# Patient Record
Sex: FEMALE | Race: WHITE | NOT HISPANIC OR LATINO | Employment: FULL TIME | RURAL
[De-identification: names, ages, dates, MRNs, and addresses within clinical notes are randomized per-mention and may not be internally consistent; named-entity substitution may affect disease eponyms.]

---

## 2017-07-19 ENCOUNTER — OFFICE VISIT (OUTPATIENT)
Dept: RETAIL CLINIC | Facility: CLINIC | Age: 37
End: 2017-07-19

## 2017-07-19 VITALS — RESPIRATION RATE: 18 BRPM | OXYGEN SATURATION: 97 % | TEMPERATURE: 97.7 F | HEART RATE: 88 BPM | WEIGHT: 140.8 LBS

## 2017-07-19 DIAGNOSIS — N39.0 URINARY TRACT INFECTION WITHOUT HEMATURIA, SITE UNSPECIFIED: Primary | ICD-10-CM

## 2017-07-19 LAB
BILIRUB BLD-MCNC: NEGATIVE MG/DL
CLARITY, POC: CLEAR
COLOR UR: YELLOW
GLUCOSE UR STRIP-MCNC: NEGATIVE MG/DL
KETONES UR QL: NEGATIVE
LEUKOCYTE EST, POC: ABNORMAL
NITRITE UR-MCNC: NEGATIVE MG/ML
PH UR: 6 [PH] (ref 5–8)
PROT UR STRIP-MCNC: ABNORMAL MG/DL
RBC # UR STRIP: NEGATIVE /UL
SP GR UR: 1.02 (ref 1–1.03)
UROBILINOGEN UR QL: NORMAL

## 2017-07-19 PROCEDURE — 99203 OFFICE O/P NEW LOW 30 MIN: CPT | Performed by: NURSE PRACTITIONER

## 2017-07-19 PROCEDURE — 81003 URINALYSIS AUTO W/O SCOPE: CPT | Performed by: NURSE PRACTITIONER

## 2017-07-19 RX ORDER — PHENAZOPYRIDINE HYDROCHLORIDE 200 MG/1
200 TABLET, FILM COATED ORAL 3 TIMES DAILY PRN
Qty: 12 TABLET | Refills: 0 | Status: SHIPPED | OUTPATIENT
Start: 2017-07-19 | End: 2017-07-21

## 2017-07-19 RX ORDER — NITROFURANTOIN 25; 75 MG/1; MG/1
100 CAPSULE ORAL 2 TIMES DAILY
Qty: 14 CAPSULE | Refills: 0 | Status: SHIPPED | OUTPATIENT
Start: 2017-07-19 | End: 2017-07-26

## 2017-07-19 NOTE — PATIENT INSTRUCTIONS
Urinary Tract Infection, Adult  A urinary tract infection (UTI) is an infection of any part of the urinary tract, which includes the kidneys, ureters, bladder, and urethra. These organs make, store, and get rid of urine in the body. UTI can be a bladder infection (cystitis) or kidney infection (pyelonephritis).  CAUSES  This infection may be caused by fungi, viruses, or bacteria. Bacteria are the most common cause of UTIs. This condition can also be caused by repeated incomplete emptying of the bladder during urination.   RISK FACTORS  This condition is more likely to develop if:   · You ignore your need to urinate or hold urine for long periods of time.  · You do not empty your bladder completely during urination.  · You wipe back to front after urinating or having a bowel movement, if you are female.  · You are uncircumcised, if you are male.    · You are constipated.  · You have a urinary catheter that stays in place (indwelling).  · You have a weak defense (immune) system.  · You have a medical condition that affects your bowels, kidneys, or bladder.  · You have diabetes.  · You take antibiotic medicines frequently or for long periods of time, and the antibiotics no longer work well against certain types of infections (antibiotic resistance).  · You take medicines that irritate your urinary tract.  · You are exposed to chemicals that irritate your urinary tract.  · You are female.  SYMPTOMS   Symptoms of this condition include:   · Fever.    · Frequent urination or passing small amounts of urine frequently.    · Needing to urinate urgently.    · Pain or burning with urination.    · Urine that smells bad or unusual.    · Cloudy urine.    · Pain in the lower abdomen or back.    · Trouble urinating.    · Blood in the urine.    · Vomiting or being less hungry than normal.     · Diarrhea or abdominal pain.    · Vaginal discharge, if you are female.  DIAGNOSIS  This condition is diagnosed with a medical history and  physical exam. You will also need to provide a urine sample to test your urine. Other tests may be done, including:    · Blood tests.    · Sexually transmitted disease (STD) testing.     If you have had more than one UTI, a cystoscopy or imaging studies may be done to determine the cause of the infections.   TREATMENT   Treatment for this condition often includes a combination of two or more of the following:   · Antibiotic medicine.    · Other medicines to treat less common causes of UTI.    · Over-the-counter medicines to treat pain.    · Drinking enough water to stay hydrated.  HOME CARE INSTRUCTIONS  · Take over-the-counter and prescription medicines only as told by your health care provider.  · If you were prescribed an antibiotic, take it as told by your health care provider. Do not stop taking the antibiotic even if you start to feel better.  · Avoid alcohol, caffeine, tea, and carbonated beverages. They can irritate your bladder.  · Drink enough fluid to keep your urine clear or pale yellow.  · Keep all follow-up visits as told by your health care provider. This is important.  · Make sure to:    Empty your bladder often and completely. Do not hold urine for long periods of time.    Empty your bladder before and after sex.    Wipe from front to back after a bowel movement if you are female. Use each tissue one time when you wipe.  SEEK MEDICAL CARE IF:   · You have back pain.    · You have a fever.  · You feel nauseous or vomit.    · Your symptoms do not get better after 3 days.     · Your symptoms go away and then return.  SEEK IMMEDIATE MEDICAL CARE IF:   · You have severe back pain or lower abdominal pain.    · You are vomiting and cannot keep down any medicines or water.     This information is not intended to replace advice given to you by your health care provider. Make sure you discuss any questions you have with your health care provider.     Document Released: 09/27/2006 Document Revised: 04/10/2017  Document Reviewed: 11/07/2016  [employer] Interactive Patient Education ©2017 [employer]

## 2017-07-19 NOTE — PROGRESS NOTES
Subjective   Gee Moraels is a 37 y.o. female.   Chief Complaint   Patient presents with   • Urinary Tract Infection      Urinary Tract Infection    This is a new problem. The current episode started in the past 7 days (3-4). The problem occurs every urination. The problem has been waxing and waning. The quality of the pain is described as burning. There has been no fever. She is sexually active. There is a history of pyelonephritis. Associated symptoms include flank pain, frequency, nausea (occassional) and urgency. Pertinent negatives include no chills. She has tried nothing for the symptoms. Her past medical history is significant for kidney stones and recurrent UTIs.          Inocencio presents to [facility] with cc of dysuria, urgency and frequency, she denies fever or chilling.  Reviewed PMFSH.  See ROS.        The following portions of the patient's history were reviewed and updated as appropriate: allergies, current medications, past family history, past medical history, past social history, past surgical history and problem list.    Review of Systems   Constitutional: Negative for chills and fever.   Gastrointestinal: Positive for abdominal pain (suprapubic tenderness) and nausea (occassional). Negative for abdominal distention.   Endocrine: Negative for cold intolerance.   Genitourinary: Positive for dysuria, flank pain, frequency and urgency. Negative for vaginal discharge.   Skin: Negative for pallor.   Allergic/Immunologic: Negative.  Negative for environmental allergies, food allergies and immunocompromised state.   Hematological: Negative for adenopathy.     Pulse 88  Temp 97.7 °F (36.5 °C) (Temporal Artery )   Resp 18  Wt 140 lb 12.8 oz (63.9 kg)  LMP 06/15/2017 (Approximate)  SpO2 97%    Objective     Current Outpatient Prescriptions:   •  nitrofurantoin, macrocrystal-monohydrate, (MACROBID) 100 MG capsule, Take 1 capsule by mouth 2 (Two) Times a Day for 7 days., Disp: 14 capsule, Rfl: 0  •   phenazopyridine (PYRIDIUM) 200 MG tablet, Take 1 tablet by mouth 3 (Three) Times a Day As Needed for bladder spasms for up to 2 days., Disp: 12 tablet, Rfl: 0  No Known Allergies    Physical Exam   Constitutional: She is oriented to person, place, and time. She appears well-developed and well-nourished. No distress.   HENT:   Head: Normocephalic and atraumatic.   Mouth/Throat: No oropharyngeal exudate.   Eyes: Conjunctivae and EOM are normal. Pupils are equal, round, and reactive to light.   Neck: Normal range of motion. Neck supple.   Cardiovascular: Normal rate, regular rhythm and normal heart sounds.    Pulmonary/Chest: Effort normal and breath sounds normal.   Abdominal: Soft. Bowel sounds are normal. She exhibits no distension and no mass. There is tenderness (suprapubic ) in the suprapubic area. There is no rebound and no guarding.   Musculoskeletal: Normal range of motion. She exhibits tenderness (mild bilateral CVT).   Neurological: She is alert and oriented to person, place, and time.   Skin: Skin is warm and dry. No rash noted.   Psychiatric: She has a normal mood and affect. Her behavior is normal. Judgment and thought content normal.   Nursing note and vitals reviewed.      Assessment/Plan   Gee was seen today for urinary tract infection.    Diagnoses and all orders for this visit:    Urinary tract infection without hematuria, site unspecified  -     POC Urinalysis Dipstick, Automated  -     nitrofurantoin, macrocrystal-monohydrate, (MACROBID) 100 MG capsule; Take 1 capsule by mouth 2 (Two) Times a Day for 7 days.  -     phenazopyridine (PYRIDIUM) 200 MG tablet; Take 1 tablet by mouth 3 (Three) Times a Day As Needed for bladder spasms for up to 2 days.      Results for orders placed or performed in visit on 07/19/17   POC Urinalysis Dipstick, Automated   Result Value Ref Range    Color Yellow Yellow, Straw, Dark Yellow, Sophia    Clarity, UA Clear Clear    Glucose, UA Negative Negative, 1000 mg/dL (3+)  mg/dL    Bilirubin Negative Negative    Ketones, UA Negative Negative    Specific Gravity  1.025 1.005 - 1.030    Blood, UA Negative Negative    pH, Urine 6.0 5.0 - 8.0    Protein, POC 1+ (A) Negative mg/dL    Urobilinogen, UA Normal Normal    Leukocytes Moderate (2+) (A) Negative    Nitrite, UA Negative Negative

## 2017-11-30 ENCOUNTER — LAB (OUTPATIENT)
Dept: LAB | Facility: HOSPITAL | Age: 37
End: 2017-11-30

## 2017-11-30 ENCOUNTER — TRANSCRIBE ORDERS (OUTPATIENT)
Dept: ADMINISTRATIVE | Facility: HOSPITAL | Age: 37
End: 2017-11-30

## 2017-11-30 DIAGNOSIS — Z13.0 SCREENING FOR IRON DEFICIENCY ANEMIA: Primary | ICD-10-CM

## 2017-11-30 DIAGNOSIS — Z13.0 SCREENING FOR IRON DEFICIENCY ANEMIA: ICD-10-CM

## 2017-11-30 DIAGNOSIS — R11.0 NAUSEA: ICD-10-CM

## 2017-11-30 LAB
6-ACETYL MORPHINE: NEGATIVE
AMPHET+METHAMPHET UR QL: NEGATIVE
BACTERIA UR QL AUTO: ABNORMAL /HPF
BARBITURATES UR QL SCN: NEGATIVE
BENZODIAZ UR QL SCN: NEGATIVE
BILIRUB UR QL STRIP: NEGATIVE
BUPRENORPHINE SERPL-MCNC: POSITIVE NG/ML
CANNABINOIDS SERPL QL: NEGATIVE
CLARITY UR: ABNORMAL
COCAINE UR QL: NEGATIVE
COLOR UR: YELLOW
GLUCOSE BLD-MCNC: 83 MG/DL (ref 70–110)
GLUCOSE UR STRIP-MCNC: NEGATIVE MG/DL
HAV IGM SERPL QL IA: ABNORMAL
HBA1C MFR BLD: 5.1 % (ref 4.5–5.7)
HBV CORE IGM SERPL QL IA: ABNORMAL
HBV SURFACE AG SERPL QL IA: ABNORMAL
HCV AB SER DONR QL: REACTIVE
HGB UR QL STRIP.AUTO: NEGATIVE
HIV1+2 AB SER QL: NORMAL
HYALINE CASTS UR QL AUTO: ABNORMAL /LPF
KETONES UR QL STRIP: ABNORMAL
LEUKOCYTE ESTERASE UR QL STRIP.AUTO: ABNORMAL
METHADONE UR QL SCN: NEGATIVE
NITRITE UR QL STRIP: POSITIVE
OPIATES UR QL: NEGATIVE
OXYCODONE UR QL SCN: NEGATIVE
PCP UR QL SCN: NEGATIVE
PH UR STRIP.AUTO: 5.5 [PH] (ref 5–8)
PROT UR QL STRIP: NEGATIVE
RBC # UR: ABNORMAL /HPF
REF LAB TEST METHOD: ABNORMAL
SP GR UR STRIP: 1.02 (ref 1–1.03)
SQUAMOUS #/AREA URNS HPF: ABNORMAL /HPF
UROBILINOGEN UR QL STRIP: ABNORMAL
WBC UR QL AUTO: ABNORMAL /HPF

## 2017-11-30 PROCEDURE — 87086 URINE CULTURE/COLONY COUNT: CPT

## 2017-11-30 PROCEDURE — 82947 ASSAY GLUCOSE BLOOD QUANT: CPT

## 2017-11-30 PROCEDURE — 80307 DRUG TEST PRSMV CHEM ANLYZR: CPT

## 2017-11-30 PROCEDURE — 86592 SYPHILIS TEST NON-TREP QUAL: CPT

## 2017-11-30 PROCEDURE — 87077 CULTURE AEROBIC IDENTIFY: CPT

## 2017-11-30 PROCEDURE — G0432 EIA HIV-1/HIV-2 SCREEN: HCPCS

## 2017-11-30 PROCEDURE — 83036 HEMOGLOBIN GLYCOSYLATED A1C: CPT

## 2017-11-30 PROCEDURE — 87186 SC STD MICRODIL/AGAR DIL: CPT

## 2017-11-30 PROCEDURE — 36415 COLL VENOUS BLD VENIPUNCTURE: CPT

## 2017-11-30 PROCEDURE — 81001 URINALYSIS AUTO W/SCOPE: CPT

## 2017-11-30 PROCEDURE — 80074 ACUTE HEPATITIS PANEL: CPT

## 2017-12-01 LAB
RUBV IGG SER QL: NORMAL
RUBV IGG SER-ACNC: 78.7 IU/ML

## 2017-12-02 LAB — RPR SER QL: NON REACTIVE

## 2017-12-03 LAB
BACTERIA SPEC AEROBE CULT: ABNORMAL
BACTERIA SPEC AEROBE CULT: ABNORMAL

## 2017-12-04 LAB
VZV IGG SER IA-ACNC: 3492 INDEX
VZV IGM SER IA-ACNC: <0.91 INDEX (ref 0–0.9)

## 2017-12-17 ENCOUNTER — APPOINTMENT (OUTPATIENT)
Dept: LAB | Facility: HOSPITAL | Age: 37
End: 2017-12-17

## 2017-12-17 ENCOUNTER — TRANSCRIBE ORDERS (OUTPATIENT)
Dept: ADMINISTRATIVE | Facility: HOSPITAL | Age: 37
End: 2017-12-17

## 2017-12-17 ENCOUNTER — LAB (OUTPATIENT)
Dept: LAB | Facility: HOSPITAL | Age: 37
End: 2017-12-17

## 2017-12-17 DIAGNOSIS — Z98.891 H/O CESAREAN SECTION: Primary | ICD-10-CM

## 2017-12-17 DIAGNOSIS — Z98.891 PREVIOUS CESAREAN SECTION: Primary | ICD-10-CM

## 2017-12-17 LAB
ABO GROUP BLD: NORMAL
BLD GP AB SCN SERPL QL: NEGATIVE
DEPRECATED RDW RBC AUTO: 38.1 FL (ref 37–54)
EOSINOPHIL # BLD MANUAL: 0.09 10*3/MM3 (ref 0–0.7)
EOSINOPHIL NFR BLD MANUAL: 1 % (ref 0–5)
ERYTHROCYTE [DISTWIDTH] IN BLOOD BY AUTOMATED COUNT: 12.7 % (ref 11.5–14.5)
HCT VFR BLD AUTO: 29.3 % (ref 37–47)
HGB BLD-MCNC: 9.7 G/DL (ref 12–16)
LYMPHOCYTES # BLD MANUAL: 1.98 10*3/MM3 (ref 1–3)
LYMPHOCYTES NFR BLD MANUAL: 22 % (ref 21–51)
LYMPHOCYTES NFR BLD MANUAL: 5 % (ref 0–10)
MCH RBC QN AUTO: 27.2 PG (ref 27–33)
MCHC RBC AUTO-ENTMCNC: 33.1 G/DL (ref 33–37)
MCV RBC AUTO: 82.1 FL (ref 80–94)
MONOCYTES # BLD AUTO: 0.45 10*3/MM3 (ref 0.1–0.9)
NEUTROPHILS # BLD AUTO: 6.49 10*3/MM3 (ref 1.4–6.5)
NEUTROPHILS NFR BLD MANUAL: 72 % (ref 30–70)
PLAT MORPH BLD: NORMAL
PLATELET # BLD AUTO: 313 10*3/MM3 (ref 130–400)
PMV BLD AUTO: 9.7 FL (ref 6–10)
RBC # BLD AUTO: 3.57 10*6/MM3 (ref 4.2–5.4)
RBC MORPH BLD: NORMAL
RH BLD: POSITIVE
WBC NRBC COR # BLD: 9.01 10*3/MM3 (ref 4.5–12.5)

## 2017-12-17 PROCEDURE — 85007 BL SMEAR W/DIFF WBC COUNT: CPT | Performed by: ADVANCED PRACTICE MIDWIFE

## 2017-12-17 PROCEDURE — 85027 COMPLETE CBC AUTOMATED: CPT | Performed by: ADVANCED PRACTICE MIDWIFE

## 2017-12-17 PROCEDURE — 36415 COLL VENOUS BLD VENIPUNCTURE: CPT | Performed by: ADVANCED PRACTICE MIDWIFE

## 2017-12-17 PROCEDURE — 86850 RBC ANTIBODY SCREEN: CPT

## 2017-12-17 PROCEDURE — 86901 BLOOD TYPING SEROLOGIC RH(D): CPT

## 2017-12-17 PROCEDURE — 86900 BLOOD TYPING SEROLOGIC ABO: CPT

## 2020-10-14 ENCOUNTER — APPOINTMENT (OUTPATIENT)
Dept: GENERAL RADIOLOGY | Facility: HOSPITAL | Age: 40
End: 2020-10-14

## 2020-10-14 ENCOUNTER — HOSPITAL ENCOUNTER (EMERGENCY)
Facility: HOSPITAL | Age: 40
Discharge: HOME | End: 2020-10-14
Attending: FAMILY MEDICINE | Admitting: FAMILY MEDICINE

## 2020-10-14 VITALS
OXYGEN SATURATION: 100 % | DIASTOLIC BLOOD PRESSURE: 81 MMHG | HEIGHT: 63 IN | WEIGHT: 140 LBS | HEART RATE: 64 BPM | TEMPERATURE: 97.3 F | RESPIRATION RATE: 16 BRPM | BODY MASS INDEX: 24.8 KG/M2 | SYSTOLIC BLOOD PRESSURE: 126 MMHG

## 2020-10-14 DIAGNOSIS — T88.7XXA NON-DOSE-RELATED ADVERSE REACTION TO MEDICATION, INITIAL ENCOUNTER: Primary | ICD-10-CM

## 2020-10-14 LAB
6-ACETYL MORPHINE: NEGATIVE
ALBUMIN SERPL-MCNC: 4.02 G/DL (ref 3.5–5.2)
ALBUMIN/GLOB SERPL: 1.2 G/DL
ALP SERPL-CCNC: 94 U/L (ref 39–117)
ALT SERPL W P-5'-P-CCNC: 32 U/L (ref 1–33)
AMPHET+METHAMPHET UR QL: POSITIVE
ANION GAP SERPL CALCULATED.3IONS-SCNC: 10.2 MMOL/L (ref 5–15)
APAP SERPL-MCNC: <5 MCG/ML (ref 0–30)
AST SERPL-CCNC: 34 U/L (ref 1–32)
BACTERIA UR QL AUTO: ABNORMAL /HPF
BARBITURATES UR QL SCN: NEGATIVE
BASOPHILS # BLD AUTO: 0.05 10*3/MM3 (ref 0–0.2)
BASOPHILS NFR BLD AUTO: 0.7 % (ref 0–1.5)
BENZODIAZ UR QL SCN: NEGATIVE
BILIRUB SERPL-MCNC: 0.3 MG/DL (ref 0–1.2)
BILIRUB UR QL STRIP: NEGATIVE
BUN SERPL-MCNC: 10 MG/DL (ref 6–20)
BUN/CREAT SERPL: 12.2 (ref 7–25)
BUPRENORPHINE SERPL-MCNC: NEGATIVE NG/ML
CALCIUM SPEC-SCNC: 8.8 MG/DL (ref 8.6–10.5)
CANNABINOIDS SERPL QL: NEGATIVE
CHLORIDE SERPL-SCNC: 104 MMOL/L (ref 98–107)
CLARITY UR: ABNORMAL
CO2 SERPL-SCNC: 23.8 MMOL/L (ref 22–29)
COCAINE UR QL: NEGATIVE
COLOR UR: ABNORMAL
CREAT SERPL-MCNC: 0.82 MG/DL (ref 0.57–1)
DEPRECATED RDW RBC AUTO: 41.6 FL (ref 37–54)
EOSINOPHIL # BLD AUTO: 0.44 10*3/MM3 (ref 0–0.4)
EOSINOPHIL NFR BLD AUTO: 5.8 % (ref 0.3–6.2)
ERYTHROCYTE [DISTWIDTH] IN BLOOD BY AUTOMATED COUNT: 14.3 % (ref 12.3–15.4)
ETHANOL BLD-MCNC: <10 MG/DL (ref 0–10)
ETHANOL UR QL: <0.01 %
GFR SERPL CREATININE-BSD FRML MDRD: 78 ML/MIN/1.73
GLOBULIN UR ELPH-MCNC: 3.3 GM/DL
GLUCOSE SERPL-MCNC: 112 MG/DL (ref 65–99)
GLUCOSE UR STRIP-MCNC: NEGATIVE MG/DL
HCG SERPL QL: NEGATIVE
HCT VFR BLD AUTO: 35.1 % (ref 34–46.6)
HGB BLD-MCNC: 11.2 G/DL (ref 12–15.9)
HGB UR QL STRIP.AUTO: ABNORMAL
HOLD SPECIMEN: NORMAL
HYALINE CASTS UR QL AUTO: ABNORMAL /LPF
IMM GRANULOCYTES # BLD AUTO: 0.07 10*3/MM3 (ref 0–0.05)
IMM GRANULOCYTES NFR BLD AUTO: 0.9 % (ref 0–0.5)
KETONES UR QL STRIP: NEGATIVE
LEUKOCYTE ESTERASE UR QL STRIP.AUTO: ABNORMAL
LYMPHOCYTES # BLD AUTO: 1.45 10*3/MM3 (ref 0.7–3.1)
LYMPHOCYTES NFR BLD AUTO: 19 % (ref 19.6–45.3)
MAGNESIUM SERPL-MCNC: 2 MG/DL (ref 1.6–2.6)
MCH RBC QN AUTO: 26 PG (ref 26.6–33)
MCHC RBC AUTO-ENTMCNC: 31.9 G/DL (ref 31.5–35.7)
MCV RBC AUTO: 81.6 FL (ref 79–97)
METHADONE UR QL SCN: NEGATIVE
MONOCYTES # BLD AUTO: 0.54 10*3/MM3 (ref 0.1–0.9)
MONOCYTES NFR BLD AUTO: 7.1 % (ref 5–12)
NEUTROPHILS NFR BLD AUTO: 5.08 10*3/MM3 (ref 1.7–7)
NEUTROPHILS NFR BLD AUTO: 66.5 % (ref 42.7–76)
NITRITE UR QL STRIP: NEGATIVE
NRBC BLD AUTO-RTO: 0 /100 WBC (ref 0–0.2)
OPIATES UR QL: NEGATIVE
OXYCODONE UR QL SCN: NEGATIVE
PCP UR QL SCN: NEGATIVE
PH UR STRIP.AUTO: 5.5 [PH] (ref 5–8)
PLATELET # BLD AUTO: 277 10*3/MM3 (ref 140–450)
PMV BLD AUTO: 9 FL (ref 6–12)
POTASSIUM SERPL-SCNC: 3.2 MMOL/L (ref 3.5–5.2)
PROT SERPL-MCNC: 7.3 G/DL (ref 6–8.5)
PROT UR QL STRIP: ABNORMAL
RBC # BLD AUTO: 4.3 10*6/MM3 (ref 3.77–5.28)
RBC # UR: ABNORMAL /HPF
REF LAB TEST METHOD: ABNORMAL
SALICYLATES SERPL-MCNC: <0.3 MG/DL
SODIUM SERPL-SCNC: 138 MMOL/L (ref 136–145)
SP GR UR STRIP: 1.01 (ref 1–1.03)
SQUAMOUS #/AREA URNS HPF: ABNORMAL /HPF
TSH SERPL DL<=0.05 MIU/L-ACNC: 2.47 UIU/ML (ref 0.27–4.2)
UROBILINOGEN UR QL STRIP: ABNORMAL
WBC # BLD AUTO: 7.63 10*3/MM3 (ref 3.4–10.8)
WBC UR QL AUTO: ABNORMAL /HPF
WHOLE BLOOD HOLD SPECIMEN: NORMAL
WHOLE BLOOD HOLD SPECIMEN: NORMAL

## 2020-10-14 PROCEDURE — 84443 ASSAY THYROID STIM HORMONE: CPT | Performed by: PHYSICIAN ASSISTANT

## 2020-10-14 PROCEDURE — 71045 X-RAY EXAM CHEST 1 VIEW: CPT | Performed by: RADIOLOGY

## 2020-10-14 PROCEDURE — 80053 COMPREHEN METABOLIC PANEL: CPT | Performed by: PHYSICIAN ASSISTANT

## 2020-10-14 PROCEDURE — 80307 DRUG TEST PRSMV CHEM ANLYZR: CPT | Performed by: PHYSICIAN ASSISTANT

## 2020-10-14 PROCEDURE — 99284 EMERGENCY DEPT VISIT MOD MDM: CPT

## 2020-10-14 PROCEDURE — 93010 ELECTROCARDIOGRAM REPORT: CPT | Performed by: INTERNAL MEDICINE

## 2020-10-14 PROCEDURE — 81001 URINALYSIS AUTO W/SCOPE: CPT | Performed by: PHYSICIAN ASSISTANT

## 2020-10-14 PROCEDURE — 85025 COMPLETE CBC W/AUTO DIFF WBC: CPT | Performed by: PHYSICIAN ASSISTANT

## 2020-10-14 PROCEDURE — 83735 ASSAY OF MAGNESIUM: CPT | Performed by: PHYSICIAN ASSISTANT

## 2020-10-14 PROCEDURE — 93005 ELECTROCARDIOGRAM TRACING: CPT | Performed by: PHYSICIAN ASSISTANT

## 2020-10-14 PROCEDURE — 84703 CHORIONIC GONADOTROPIN ASSAY: CPT | Performed by: PHYSICIAN ASSISTANT

## 2020-10-14 PROCEDURE — 96374 THER/PROPH/DIAG INJ IV PUSH: CPT

## 2020-10-14 PROCEDURE — 71045 X-RAY EXAM CHEST 1 VIEW: CPT

## 2020-10-14 PROCEDURE — 25010000002 ONDANSETRON PER 1 MG: Performed by: FAMILY MEDICINE

## 2020-10-14 RX ORDER — SODIUM CHLORIDE 0.9 % (FLUSH) 0.9 %
10 SYRINGE (ML) INJECTION AS NEEDED
Status: DISCONTINUED | OUTPATIENT
Start: 2020-10-14 | End: 2020-10-15 | Stop reason: HOSPADM

## 2020-10-14 RX ORDER — ONDANSETRON 2 MG/ML
4 INJECTION INTRAMUSCULAR; INTRAVENOUS ONCE
Status: COMPLETED | OUTPATIENT
Start: 2020-10-14 | End: 2020-10-14

## 2020-10-14 RX ADMIN — ONDANSETRON 4 MG: 2 INJECTION INTRAMUSCULAR; INTRAVENOUS at 19:38

## 2020-10-14 RX ADMIN — SODIUM CHLORIDE 1000 ML: 9 INJECTION, SOLUTION INTRAVENOUS at 22:03

## 2020-12-06 ENCOUNTER — HOSPITAL ENCOUNTER (EMERGENCY)
Facility: HOSPITAL | Age: 40
Discharge: HOME | End: 2020-12-06
Attending: FAMILY MEDICINE | Admitting: FAMILY MEDICINE

## 2020-12-06 VITALS
TEMPERATURE: 98.9 F | HEIGHT: 64 IN | RESPIRATION RATE: 18 BRPM | OXYGEN SATURATION: 99 % | SYSTOLIC BLOOD PRESSURE: 148 MMHG | WEIGHT: 144 LBS | BODY MASS INDEX: 24.59 KG/M2 | DIASTOLIC BLOOD PRESSURE: 101 MMHG | HEART RATE: 99 BPM

## 2020-12-06 DIAGNOSIS — Z20.822 ENCOUNTER FOR LABORATORY TESTING FOR COVID-19 VIRUS: Primary | ICD-10-CM

## 2020-12-06 LAB
FLUAV RNA RESP QL NAA+PROBE: NOT DETECTED
FLUBV RNA RESP QL NAA+PROBE: NOT DETECTED
SARS-COV-2 RNA RESP QL NAA+PROBE: NOT DETECTED

## 2020-12-06 PROCEDURE — 87636 SARSCOV2 & INF A&B AMP PRB: CPT | Performed by: PHYSICIAN ASSISTANT

## 2020-12-06 PROCEDURE — 99283 EMERGENCY DEPT VISIT LOW MDM: CPT

## 2020-12-06 PROCEDURE — C9803 HOPD COVID-19 SPEC COLLECT: HCPCS

## 2020-12-06 RX ORDER — ONDANSETRON 4 MG/1
4 TABLET, ORALLY DISINTEGRATING ORAL ONCE
Status: DISCONTINUED | OUTPATIENT
Start: 2020-12-06 | End: 2020-12-07 | Stop reason: HOSPADM

## 2020-12-06 RX ORDER — ONDANSETRON 4 MG/1
4 TABLET, ORALLY DISINTEGRATING ORAL EVERY 6 HOURS PRN
Qty: 20 TABLET | Refills: 0 | Status: SHIPPED | OUTPATIENT
Start: 2020-12-06

## 2020-12-07 NOTE — ED PROVIDER NOTES
Subjective   40-year-old female presents to the ER with complaints of Covid-like symptoms.  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  Patient states she has started to feel symptomatic today.  Patient states she has a mild cough.          Review of Systems   Constitutional: Negative.  Negative for fever.   HENT: Negative.    Respiratory: Positive for cough.    Cardiovascular: Negative.  Negative for chest pain.   Gastrointestinal: Negative.  Negative for abdominal pain.   Endocrine: Negative.    Genitourinary: Negative.  Negative for dysuria.   Skin: Negative.    Neurological: Negative.    Psychiatric/Behavioral: Negative.    All other systems reviewed and are negative.      Past Medical History:   Diagnosis Date   • Kidney stone    • Urinary tract infection        Allergies   Allergen Reactions   • Reglan [Metoclopramide] Rash       Past Surgical History:   Procedure Laterality Date   •  SECTION  ;    • CHOLECYSTECTOMY     • CYSTOSCOPY BLADDER STONE LITHOTRIPSY  2012   • DILATION AND CURETTAGE, DIAGNOSTIC / THERAPEUTIC         Family History   Problem Relation Age of Onset   • Cancer Maternal Grandmother    • Heart disease Maternal Grandmother    • Stroke Maternal Grandmother    • Cancer Paternal Grandfather    • Heart disease Paternal Grandfather    • Diabetes Paternal Grandfather        Social History     Socioeconomic History   • Marital status:      Spouse name: Not on file   • Number of children: Not on file   • Years of education: Not on file   • Highest education level: Not on file   Tobacco Use   • Smoking status: Never Smoker   Substance and Sexual Activity   • Alcohol use: No   • Drug use: No   • Sexual activity: Yes     Birth control/protection: Condom     Comment: nurse,  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   Constitutional:        General: She is not in acute distress.     Appearance: She is well-developed. She is not diaphoretic.   HENT:      Head: Normocephalic and atraumatic.      Right Ear: External ear normal.      Left Ear: External ear normal.      Nose: Nose normal.   Eyes:      Conjunctiva/sclera: Conjunctivae normal.   Neck:      Musculoskeletal: Normal range of motion and neck supple.      Vascular: No JVD.      Trachea: No tracheal deviation.   Cardiovascular:      Rate and Rhythm: Normal rate and regular rhythm.      Heart sounds: No murmur.   Pulmonary:      Effort: Pulmonary effort is normal. No respiratory distress.      Breath sounds: No wheezing.   Abdominal:      Palpations: Abdomen is soft.      Tenderness: There is no abdominal tenderness.   Musculoskeletal: Normal range of motion.         General: No deformity.   Skin:     General: Skin is warm and dry.      Coloration: Skin is not pale.      Findings: No erythema or rash.   Neurological:      Mental Status: She is alert and oriented to person, place, and time.      Cranial Nerves: No cranial nerve deficit.   Psychiatric:         Behavior: Behavior normal.         Thought Content: Thought content normal.         Procedures           ED Course                                           MDM  Number of Diagnoses or Management Options  Encounter for laboratory testing for COVID-19 virus: new and requires workup     Amount and/or Complexity of Data Reviewed  Clinical lab tests: ordered and reviewed    Risk of Complications, Morbidity, and/or Mortality  Presenting problems: low  Diagnostic procedures: low  Management options: low    Patient Progress  Patient progress: stable      Final diagnoses:   Encounter for laboratory testing for COVID-19 virus            Nahun Martinez II, PA  12/06/20 2297

## 2020-12-07 NOTE — DISCHARGE INSTRUCTIONS
Call one of the offices below to establish a primary care provider.  If you are unable to get an appointment and feel it is an emergency and need to be seen immediately please return to the Emergency Department.    Call one of the office below to set up a primary care provider.    Dr. Greg Hercules                                                                                                       [address] [facility] [address]  [phone]    Dr. Moyer, Dr. EPIFANIO Pisano, Dr. EMIL Pisano ([facility])  [address] [facility] [address]  [phone]    Dr. Beavers, Dr. Rosales, Dr. Walsh ([facility])  [address] [facility] [address]  [phone]    Dr. Fofana  [address] [facility] [address]  [phone]    Dr. Costa, Dr. Rosa, Dr. Herrera, Dr. Patrick ([facility])  [address] [place] [address] [address]  [facility] [address]  [phone]    Dr. Altagracia Garcia  [address] [facility] [place] [address]  [phone]    Dr. Zoe Felix  [address]   [address]  [facility] [address]  [phone]    Dr. Hercules  [address] [facility] [address]  [phone]    Dr. Manzo, Dr. Kilpatrick  [address] [facility] [place] [address]  [phone]    Dr. Chris  [address] [address]                                                              [address]  [facility] [address]  [phone]    Dr. Rodriguez  [address] [facility] [address]  [phone]    Dr. Fozia Mcintyre  [address]  [address]  [place] [address]  [phone]    Dr. Jasso and [facility]   [address] [facility]  [address]  [place] [address]  [phone]

## 2023-09-10 ENCOUNTER — HOSPITAL ENCOUNTER (EMERGENCY)
Facility: HOSPITAL | Age: 43
Discharge: HOME | End: 2023-09-10
Attending: EMERGENCY MEDICINE | Admitting: EMERGENCY MEDICINE
Payer: MEDICAID

## 2023-09-10 VITALS
TEMPERATURE: 98.2 F | HEIGHT: 63 IN | DIASTOLIC BLOOD PRESSURE: 103 MMHG | WEIGHT: 150 LBS | OXYGEN SATURATION: 97 % | RESPIRATION RATE: 18 BRPM | SYSTOLIC BLOOD PRESSURE: 152 MMHG | HEART RATE: 91 BPM | BODY MASS INDEX: 26.58 KG/M2

## 2023-09-10 DIAGNOSIS — F10.930 ALCOHOL WITHDRAWAL SYNDROME WITHOUT COMPLICATION: Primary | ICD-10-CM

## 2023-09-10 LAB
ALBUMIN SERPL-MCNC: 4.3 G/DL (ref 3.5–5.2)
ALBUMIN/GLOB SERPL: 1.3 G/DL
ALP SERPL-CCNC: 88 U/L (ref 39–117)
ALT SERPL W P-5'-P-CCNC: 30 U/L (ref 1–33)
ANION GAP SERPL CALCULATED.3IONS-SCNC: 12 MMOL/L (ref 5–15)
AST SERPL-CCNC: 49 U/L (ref 1–32)
BASOPHILS # BLD AUTO: 0.05 10*3/MM3 (ref 0–0.2)
BASOPHILS NFR BLD AUTO: 0.7 % (ref 0–1.5)
BILIRUB SERPL-MCNC: 0.4 MG/DL (ref 0–1.2)
BUN SERPL-MCNC: 11 MG/DL (ref 6–20)
BUN/CREAT SERPL: 13.9 (ref 7–25)
CALCIUM SPEC-SCNC: 9.6 MG/DL (ref 8.6–10.5)
CHLORIDE SERPL-SCNC: 102 MMOL/L (ref 98–107)
CO2 SERPL-SCNC: 26 MMOL/L (ref 22–29)
CREAT SERPL-MCNC: 0.79 MG/DL (ref 0.57–1)
DEPRECATED RDW RBC AUTO: 44.5 FL (ref 37–54)
EGFRCR SERPLBLD CKD-EPI 2021: 96.5 ML/MIN/1.73
EOSINOPHIL # BLD AUTO: 0.25 10*3/MM3 (ref 0–0.4)
EOSINOPHIL NFR BLD AUTO: 3.6 % (ref 0.3–6.2)
ERYTHROCYTE [DISTWIDTH] IN BLOOD BY AUTOMATED COUNT: 14 % (ref 12.3–15.4)
ETHANOL BLD-MCNC: <10 MG/DL (ref 0–10)
GLOBULIN UR ELPH-MCNC: 3.4 GM/DL
GLUCOSE SERPL-MCNC: 113 MG/DL (ref 65–99)
HCT VFR BLD AUTO: 40.5 % (ref 34–46.6)
HGB BLD-MCNC: 12.9 G/DL (ref 12–15.9)
HOLD SPECIMEN: NORMAL
IMM GRANULOCYTES # BLD AUTO: 0.03 10*3/MM3 (ref 0–0.05)
IMM GRANULOCYTES NFR BLD AUTO: 0.4 % (ref 0–0.5)
LYMPHOCYTES # BLD AUTO: 1.6 10*3/MM3 (ref 0.7–3.1)
LYMPHOCYTES NFR BLD AUTO: 23.2 % (ref 19.6–45.3)
MCH RBC QN AUTO: 28.3 PG (ref 26.6–33)
MCHC RBC AUTO-ENTMCNC: 31.9 G/DL (ref 31.5–35.7)
MCV RBC AUTO: 88.8 FL (ref 79–97)
MONOCYTES # BLD AUTO: 0.42 10*3/MM3 (ref 0.1–0.9)
MONOCYTES NFR BLD AUTO: 6.1 % (ref 5–12)
NEUTROPHILS NFR BLD AUTO: 4.56 10*3/MM3 (ref 1.7–7)
NEUTROPHILS NFR BLD AUTO: 66 % (ref 42.7–76)
NRBC BLD AUTO-RTO: 0 /100 WBC (ref 0–0.2)
PLATELET # BLD AUTO: 352 10*3/MM3 (ref 140–450)
PMV BLD AUTO: 9.1 FL (ref 6–12)
POTASSIUM SERPL-SCNC: 4.2 MMOL/L (ref 3.5–5.2)
PROT SERPL-MCNC: 7.7 G/DL (ref 6–8.5)
RBC # BLD AUTO: 4.56 10*6/MM3 (ref 3.77–5.28)
SODIUM SERPL-SCNC: 140 MMOL/L (ref 136–145)
WBC NRBC COR # BLD: 6.91 10*3/MM3 (ref 3.4–10.8)
WHOLE BLOOD HOLD COAG: NORMAL
WHOLE BLOOD HOLD SPECIMEN: NORMAL

## 2023-09-10 PROCEDURE — 25010000002 ONDANSETRON PER 1 MG: Performed by: EMERGENCY MEDICINE

## 2023-09-10 PROCEDURE — 82077 ASSAY SPEC XCP UR&BREATH IA: CPT | Performed by: EMERGENCY MEDICINE

## 2023-09-10 PROCEDURE — 85025 COMPLETE CBC W/AUTO DIFF WBC: CPT | Performed by: EMERGENCY MEDICINE

## 2023-09-10 PROCEDURE — 96374 THER/PROPH/DIAG INJ IV PUSH: CPT

## 2023-09-10 PROCEDURE — 99283 EMERGENCY DEPT VISIT LOW MDM: CPT

## 2023-09-10 PROCEDURE — 36415 COLL VENOUS BLD VENIPUNCTURE: CPT

## 2023-09-10 PROCEDURE — 80053 COMPREHEN METABOLIC PANEL: CPT | Performed by: EMERGENCY MEDICINE

## 2023-09-10 RX ORDER — FOLIC ACID 1 MG/1
1 TABLET ORAL DAILY
Status: DISCONTINUED | OUTPATIENT
Start: 2023-09-10 | End: 2023-09-10 | Stop reason: HOSPADM

## 2023-09-10 RX ORDER — ONDANSETRON 4 MG/1
4 TABLET, ORALLY DISINTEGRATING ORAL EVERY 6 HOURS PRN
Qty: 9 TABLET | Refills: 0 | Status: ON HOLD | OUTPATIENT
Start: 2023-09-10

## 2023-09-10 RX ORDER — CHLORDIAZEPOXIDE HYDROCHLORIDE 25 MG/1
CAPSULE, GELATIN COATED ORAL
Qty: 15 CAPSULE | Refills: 0 | Status: SHIPPED | OUTPATIENT
Start: 2023-09-10 | End: 2023-09-13

## 2023-09-10 RX ORDER — PHENTERMINE HYDROCHLORIDE 37.5 MG/1
37.5 CAPSULE ORAL EVERY MORNING
Status: ON HOLD | COMMUNITY
End: 2023-09-16

## 2023-09-10 RX ORDER — ONDANSETRON 2 MG/ML
4 INJECTION INTRAMUSCULAR; INTRAVENOUS ONCE
Status: COMPLETED | OUTPATIENT
Start: 2023-09-10 | End: 2023-09-10

## 2023-09-10 RX ORDER — LORAZEPAM 1 MG/1
2 TABLET ORAL ONCE
Status: COMPLETED | OUTPATIENT
Start: 2023-09-10 | End: 2023-09-10

## 2023-09-10 RX ADMIN — SODIUM CHLORIDE, POTASSIUM CHLORIDE, SODIUM LACTATE AND CALCIUM CHLORIDE 1000 ML: 600; 310; 30; 20 INJECTION, SOLUTION INTRAVENOUS at 14:03

## 2023-09-10 RX ADMIN — FOLIC ACID 1 MG: 1 TABLET ORAL at 14:06

## 2023-09-10 RX ADMIN — ONDANSETRON 4 MG: 2 INJECTION INTRAMUSCULAR; INTRAVENOUS at 14:04

## 2023-09-10 RX ADMIN — LORAZEPAM 2 MG: 1 TABLET ORAL at 14:11

## 2023-09-10 NOTE — ED PROVIDER NOTES
Subjective   History of Present Illness  Patient presents for evaluation of symptoms she believes related to alcohol withdrawal including nausea, anxiety, tremulousness.  Patient states she has been drinking approximately fifth of vodka daily throughout the day over the past 2 years.  She has tried quitting at home but has difficulty due to the above-mentioned symptoms.  She has never used medications to help with withdrawal.  She has never had DTs or seizures.  She does desire to stop using alcohol.    History provided by:  Patient    Review of Systems    Past Medical History:   Diagnosis Date    Kidney stone     Urinary tract infection        Allergies   Allergen Reactions    Reglan [Metoclopramide] Rash       Past Surgical History:   Procedure Laterality Date     SECTION  ;     CHOLECYSTECTOMY      CYSTOSCOPY BLADDER STONE LITHOTRIPSY  ;      DILATION AND CURETTAGE, DIAGNOSTIC / THERAPEUTIC         Family History   Problem Relation Age of Onset    Cancer Maternal Grandmother     Heart disease Maternal Grandmother     Stroke Maternal Grandmother     Cancer Paternal Grandfather     Heart disease Paternal Grandfather     Diabetes Paternal Grandfather        Social History     Socioeconomic History    Marital status:    Tobacco Use    Smoking status: Never   Substance and Sexual Activity    Alcohol use: No    Drug use: No    Sexual activity: Yes     Birth control/protection: Condom     Comment: nurse,  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     Appearance: She is not ill-appearing.   HENT:      Head: Normocephalic and atraumatic.   Eyes:      Conjunctiva/sclera: Conjunctivae normal.      Pupils: Pupils are equal, round, and reactive to light.   Cardiovascular:      Rate and Rhythm: Normal rate and regular rhythm. Tachycardia present.      Pulses: Normal pulses.      Heart sounds: No  murmur heard.    No gallop.   Pulmonary:      Effort: Pulmonary effort is normal. No respiratory distress.   Abdominal:      General: Abdomen is flat. There is no distension.      Tenderness: There is no abdominal tenderness.   Musculoskeletal:         General: No swelling or deformity. Normal range of motion.      Right lower leg: No edema.      Left lower leg: No edema.      Comments: Fine tremor of the bilateral upper extremities   Skin:     General: Skin is warm and dry.      Capillary Refill: Capillary refill takes less than 2 seconds.   Neurological:      General: No focal deficit present.      Mental Status: She is alert and oriented to person, place, and time.   Psychiatric:         Mood and Affect: Mood normal.         Behavior: Behavior normal.       Procedures           ED Course                                           Medical Decision Making  Patient signs and symptoms are consistent with acute alcohol withdrawal.  Labs were obtained, IV fluids and antiemetics and oral benzodiazepines were administered.    Patient was reassessed and she is significantly improved.  Tachycardia resolved.  I believe she is appropriate for an outpatient trial with Librium for alcohol withdrawal symptoms.  She was counseled on return precautions to the ER.  She was counseled on avoidance of alcohol, discharged in good condition    Problems Addressed:  Alcohol withdrawal syndrome without complication: complicated acute illness or injury    Amount and/or Complexity of Data Reviewed  Labs: ordered.     Details: Laboratory work-up independently interpreted by myself demonstrate no actionable abnormalities.    Risk  OTC drugs.  Prescription drug management.        Final diagnoses:   Alcohol withdrawal syndrome without complication       ED Disposition  ED Disposition       ED Disposition   Discharge    Condition   Stable    Comment   --             Recent Results (from the past 24 hour(s))   Comprehensive Metabolic Panel     Collection Time: 09/10/23 12:09 PM    Specimen: Blood   Result Value Ref Range    Glucose 113 (H) 65 - 99 mg/dL    BUN 11 6 - 20 mg/dL    Creatinine 0.79 0.57 - 1.00 mg/dL    Sodium 140 136 - 145 mmol/L    Potassium 4.2 3.5 - 5.2 mmol/L    Chloride 102 98 - 107 mmol/L    CO2 26.0 22.0 - 29.0 mmol/L    Calcium 9.6 8.6 - 10.5 mg/dL    Total Protein 7.7 6.0 - 8.5 g/dL    Albumin 4.3 3.5 - 5.2 g/dL    ALT (SGPT) 30 1 - 33 U/L    AST (SGOT) 49 (H) 1 - 32 U/L    Alkaline Phosphatase 88 39 - 117 U/L    Total Bilirubin 0.4 0.0 - 1.2 mg/dL    Globulin 3.4 gm/dL    A/G Ratio 1.3 g/dL    BUN/Creatinine Ratio 13.9 7.0 - 25.0    Anion Gap 12.0 5.0 - 15.0 mmol/L    eGFR 96.5 >60.0 mL/min/1.73   Ethanol    Collection Time: 09/10/23 12:09 PM    Specimen: Blood   Result Value Ref Range    Ethanol <10 0 - 10 mg/dL   Green Top (Gel)    Collection Time: 09/10/23 12:09 PM   Result Value Ref Range    Extra Tube Hold for add-ons.    Lavender Top    Collection Time: 09/10/23 12:09 PM   Result Value Ref Range    Extra Tube hold for add-on    Gold Top - SST    Collection Time: 09/10/23 12:09 PM   Result Value Ref Range    Extra Tube Hold for add-ons.    Light Blue Top    Collection Time: 09/10/23 12:09 PM   Result Value Ref Range    Extra Tube Hold for add-ons.    CBC Auto Differential    Collection Time: 09/10/23 12:09 PM    Specimen: Blood   Result Value Ref Range    WBC 6.91 3.40 - 10.80 10*3/mm3    RBC 4.56 3.77 - 5.28 10*6/mm3    Hemoglobin 12.9 12.0 - 15.9 g/dL    Hematocrit 40.5 34.0 - 46.6 %    MCV 88.8 79.0 - 97.0 fL    MCH 28.3 26.6 - 33.0 pg    MCHC 31.9 31.5 - 35.7 g/dL    RDW 14.0 12.3 - 15.4 %    RDW-SD 44.5 37.0 - 54.0 fl    MPV 9.1 6.0 - 12.0 fL    Platelets 352 140 - 450 10*3/mm3    Neutrophil % 66.0 42.7 - 76.0 %    Lymphocyte % 23.2 19.6 - 45.3 %    Monocyte % 6.1 5.0 - 12.0 %    Eosinophil % 3.6 0.3 - 6.2 %    Basophil % 0.7 0.0 - 1.5 %    Immature Grans % 0.4 0.0 - 0.5 %    Neutrophils, Absolute 4.56 1.70 - 7.00 10*3/mm3  "   Lymphocytes, Absolute 1.60 0.70 - 3.10 10*3/mm3    Monocytes, Absolute 0.42 0.10 - 0.90 10*3/mm3    Eosinophils, Absolute 0.25 0.00 - 0.40 10*3/mm3    Basophils, Absolute 0.05 0.00 - 0.20 10*3/mm3    Immature Grans, Absolute 0.03 0.00 - 0.05 10*3/mm3    nRBC 0.0 0.0 - 0.2 /100 WBC     Note: In addition to lab results from this visit, the labs listed above may include labs taken at another facility or during a different encounter within the last 24 hours. Please correlate lab times with ED admission and discharge times for further clarification of the services performed during this visit.    No orders to display     Vitals:    09/10/23 1151 09/10/23 1348 09/10/23 1349 09/10/23 1444   BP: (!) 171/122 (!) 174/118  (!) 152/103   BP Location: Left arm      Patient Position: Sitting      Pulse: 115  98 91   Resp: 18      Temp: 98.2 °F (36.8 °C)      TempSrc: Oral      SpO2: 99%   97%   Weight: 68 kg (150 lb)      Height: 160 cm (63\")        Medications   Magnesium Standard Dose Replacement - Follow Nurse / BPA Driven Protocol (has no administration in time range)   folic acid (FOLVITE) tablet 1 mg (1 mg Oral Given 9/10/23 1406)   lactated ringers bolus 1,000 mL (0 mL Intravenous Stopped 9/10/23 1526)   LORazepam (ATIVAN) tablet 2 mg (2 mg Oral Given 9/10/23 1411)   ondansetron (ZOFRAN) injection 4 mg (4 mg Intravenous Given 9/10/23 1404)     ECG/EMG Results (last 24 hours)       ** No results found for the last 24 hours. **          No orders to display         No follow-up provider specified.       Medication List        New Prescriptions      chlordiazePOXIDE 25 MG capsule  Commonly known as: LIBRIUM  Take 2 capsules by mouth 4 (Four) Times a Day As Needed for Anxiety or Withdrawal for 1 day, THEN 1 capsule 4 (Four) Times a Day As Needed for 1 day, THEN 1 capsule 2 (Two) Times a Day As Needed for 1 day, THEN 1 capsule At Night As Needed for up to 1 day.  Start taking on: September 10, 2023            Changed  "     ondansetron ODT 4 MG disintegrating tablet  Commonly known as: ZOFRAN-ODT  Place 1 tablet on the tongue Every 6 (Six) Hours As Needed for Nausea or Vomiting.  What changed:   how much to take  how to take this  when to take this  reasons to take this  Another medication with the same name was removed. Continue taking this medication, and follow the directions you see here.               Where to Get Your Medications        These medications were sent to [id number] - [place], [place] - [address] AT [facility] [address] - [phone] [facility] [phone] FX  [address] [facility] [facility] [place] [facility] [address]      Phone: [phone]   chlordiazePOXIDE 25 MG capsule  ondansetron ODT 4 MG disintegrating tablet            Bryce Velazquez MD  09/10/23 7785

## 2023-09-10 NOTE — DISCHARGE INSTRUCTIONS
Take prescribed Librium for treatment of alcohol withdrawal symptoms.  Do not take alcohol while on this medication.  Take prescribed Zofran as needed for nausea or vomiting.  Drink plenty of fluids to stay well-hydrated.  Return to the ER as needed for new or worsening symptoms.

## 2023-09-15 ENCOUNTER — HOSPITAL ENCOUNTER (EMERGENCY)
Facility: HOSPITAL | Age: 43
Discharge: STILL ADMITTED OR ADMINISTRATIVE | DRG: 897 | End: 2023-09-16
Attending: STUDENT IN AN ORGANIZED HEALTH CARE EDUCATION/TRAINING PROGRAM
Payer: MEDICAID

## 2023-09-15 DIAGNOSIS — F10.20 ALCOHOLISM: Primary | ICD-10-CM

## 2023-09-15 LAB
ALBUMIN SERPL-MCNC: 4.2 G/DL (ref 3.5–5.2)
ALBUMIN/GLOB SERPL: 1.1 G/DL
ALP SERPL-CCNC: 87 U/L (ref 39–117)
ALT SERPL W P-5'-P-CCNC: 32 U/L (ref 1–33)
ANION GAP SERPL CALCULATED.3IONS-SCNC: 10.1 MMOL/L (ref 5–15)
APAP SERPL-MCNC: <5 MCG/ML (ref 0–30)
AST SERPL-CCNC: 55 U/L (ref 1–32)
BASOPHILS # BLD AUTO: 0.04 10*3/MM3 (ref 0–0.2)
BASOPHILS NFR BLD AUTO: 0.6 % (ref 0–1.5)
BILIRUB SERPL-MCNC: 0.3 MG/DL (ref 0–1.2)
BUN SERPL-MCNC: 10 MG/DL (ref 6–20)
BUN/CREAT SERPL: 11.4 (ref 7–25)
CALCIUM SPEC-SCNC: 9.8 MG/DL (ref 8.6–10.5)
CHLORIDE SERPL-SCNC: 105 MMOL/L (ref 98–107)
CO2 SERPL-SCNC: 21.9 MMOL/L (ref 22–29)
CREAT SERPL-MCNC: 0.88 MG/DL (ref 0.57–1)
DEPRECATED RDW RBC AUTO: 45 FL (ref 37–54)
EGFRCR SERPLBLD CKD-EPI 2021: 84.8 ML/MIN/1.73
EOSINOPHIL # BLD AUTO: 0.24 10*3/MM3 (ref 0–0.4)
EOSINOPHIL NFR BLD AUTO: 3.5 % (ref 0.3–6.2)
ERYTHROCYTE [DISTWIDTH] IN BLOOD BY AUTOMATED COUNT: 14.4 % (ref 12.3–15.4)
ETHANOL BLD-MCNC: <10 MG/DL (ref 0–10)
ETHANOL UR QL: <0.01 %
GLOBULIN UR ELPH-MCNC: 3.8 GM/DL
GLUCOSE BLDC GLUCOMTR-MCNC: 106 MG/DL (ref 70–130)
GLUCOSE SERPL-MCNC: 97 MG/DL (ref 65–99)
HCG SERPL QL: NEGATIVE
HCT VFR BLD AUTO: 39.4 % (ref 34–46.6)
HGB BLD-MCNC: 12.8 G/DL (ref 12–15.9)
HOLD SPECIMEN: NORMAL
IMM GRANULOCYTES # BLD AUTO: 0.02 10*3/MM3 (ref 0–0.05)
IMM GRANULOCYTES NFR BLD AUTO: 0.3 % (ref 0–0.5)
INR PPP: 0.83 (ref 0.9–1.1)
LYMPHOCYTES # BLD AUTO: 1.17 10*3/MM3 (ref 0.7–3.1)
LYMPHOCYTES NFR BLD AUTO: 17.2 % (ref 19.6–45.3)
MCH RBC QN AUTO: 28.2 PG (ref 26.6–33)
MCHC RBC AUTO-ENTMCNC: 32.5 G/DL (ref 31.5–35.7)
MCV RBC AUTO: 86.8 FL (ref 79–97)
MONOCYTES # BLD AUTO: 0.51 10*3/MM3 (ref 0.1–0.9)
MONOCYTES NFR BLD AUTO: 7.5 % (ref 5–12)
NEUTROPHILS NFR BLD AUTO: 4.81 10*3/MM3 (ref 1.7–7)
NEUTROPHILS NFR BLD AUTO: 70.9 % (ref 42.7–76)
NRBC BLD AUTO-RTO: 0 /100 WBC (ref 0–0.2)
PLATELET # BLD AUTO: 347 10*3/MM3 (ref 140–450)
PMV BLD AUTO: 8.8 FL (ref 6–12)
POTASSIUM SERPL-SCNC: 4.7 MMOL/L (ref 3.5–5.2)
PROT SERPL-MCNC: 8 G/DL (ref 6–8.5)
PROTHROMBIN TIME: 11.9 SECONDS (ref 12.1–14.7)
RBC # BLD AUTO: 4.54 10*6/MM3 (ref 3.77–5.28)
SALICYLATES SERPL-MCNC: <0.3 MG/DL
SODIUM SERPL-SCNC: 137 MMOL/L (ref 136–145)
TSH SERPL DL<=0.05 MIU/L-ACNC: 1.48 UIU/ML (ref 0.27–4.2)
WBC NRBC COR # BLD: 6.79 10*3/MM3 (ref 3.4–10.8)
WHOLE BLOOD HOLD COAG: NORMAL
WHOLE BLOOD HOLD SPECIMEN: NORMAL

## 2023-09-15 PROCEDURE — 83735 ASSAY OF MAGNESIUM: CPT | Performed by: NURSE PRACTITIONER

## 2023-09-15 PROCEDURE — 80143 DRUG ASSAY ACETAMINOPHEN: CPT | Performed by: STUDENT IN AN ORGANIZED HEALTH CARE EDUCATION/TRAINING PROGRAM

## 2023-09-15 PROCEDURE — 80179 DRUG ASSAY SALICYLATE: CPT | Performed by: STUDENT IN AN ORGANIZED HEALTH CARE EDUCATION/TRAINING PROGRAM

## 2023-09-15 PROCEDURE — 82948 REAGENT STRIP/BLOOD GLUCOSE: CPT

## 2023-09-15 PROCEDURE — 36415 COLL VENOUS BLD VENIPUNCTURE: CPT

## 2023-09-15 PROCEDURE — 82077 ASSAY SPEC XCP UR&BREATH IA: CPT | Performed by: STUDENT IN AN ORGANIZED HEALTH CARE EDUCATION/TRAINING PROGRAM

## 2023-09-15 PROCEDURE — 99285 EMERGENCY DEPT VISIT HI MDM: CPT

## 2023-09-15 PROCEDURE — 85025 COMPLETE CBC W/AUTO DIFF WBC: CPT | Performed by: STUDENT IN AN ORGANIZED HEALTH CARE EDUCATION/TRAINING PROGRAM

## 2023-09-15 PROCEDURE — 85610 PROTHROMBIN TIME: CPT | Performed by: STUDENT IN AN ORGANIZED HEALTH CARE EDUCATION/TRAINING PROGRAM

## 2023-09-15 PROCEDURE — 84443 ASSAY THYROID STIM HORMONE: CPT | Performed by: STUDENT IN AN ORGANIZED HEALTH CARE EDUCATION/TRAINING PROGRAM

## 2023-09-15 PROCEDURE — 84703 CHORIONIC GONADOTROPIN ASSAY: CPT | Performed by: STUDENT IN AN ORGANIZED HEALTH CARE EDUCATION/TRAINING PROGRAM

## 2023-09-15 PROCEDURE — 80053 COMPREHEN METABOLIC PANEL: CPT | Performed by: STUDENT IN AN ORGANIZED HEALTH CARE EDUCATION/TRAINING PROGRAM

## 2023-09-15 PROCEDURE — 80074 ACUTE HEPATITIS PANEL: CPT | Performed by: PSYCHIATRY & NEUROLOGY

## 2023-09-15 RX ORDER — CHLORDIAZEPOXIDE HYDROCHLORIDE 25 MG/1
25 CAPSULE, GELATIN COATED ORAL EVERY 8 HOURS SCHEDULED
Status: DISCONTINUED | OUTPATIENT
Start: 2023-09-15 | End: 2023-09-15

## 2023-09-15 RX ORDER — SODIUM CHLORIDE 0.9 % (FLUSH) 0.9 %
10 SYRINGE (ML) INJECTION AS NEEDED
Status: DISCONTINUED | OUTPATIENT
Start: 2023-09-15 | End: 2023-09-16 | Stop reason: HOSPADM

## 2023-09-15 RX ORDER — CHLORDIAZEPOXIDE HYDROCHLORIDE 10 MG/1
20 CAPSULE, GELATIN COATED ORAL EVERY 8 HOURS SCHEDULED
Status: DISCONTINUED | OUTPATIENT
Start: 2023-09-16 | End: 2023-09-16

## 2023-09-15 RX ORDER — CLONIDINE HYDROCHLORIDE 0.1 MG/1
0.1 TABLET ORAL 2 TIMES DAILY PRN
Status: DISCONTINUED | OUTPATIENT
Start: 2023-09-18 | End: 2023-09-16 | Stop reason: HOSPADM

## 2023-09-15 RX ORDER — HYDRALAZINE HYDROCHLORIDE 25 MG/1
25 TABLET, FILM COATED ORAL DAILY PRN
Status: DISCONTINUED | OUTPATIENT
Start: 2023-09-15 | End: 2023-09-16 | Stop reason: HOSPADM

## 2023-09-15 RX ORDER — DICYCLOMINE HYDROCHLORIDE 10 MG/1
10 CAPSULE ORAL 3 TIMES DAILY PRN
Status: DISCONTINUED | OUTPATIENT
Start: 2023-09-15 | End: 2023-09-16 | Stop reason: HOSPADM

## 2023-09-15 RX ORDER — CHLORDIAZEPOXIDE HYDROCHLORIDE 25 MG/1
25 CAPSULE, GELATIN COATED ORAL 2 TIMES DAILY
Status: DISCONTINUED | OUTPATIENT
Start: 2023-09-16 | End: 2023-09-15

## 2023-09-15 RX ORDER — CLONIDINE HYDROCHLORIDE 0.1 MG/1
0.1 TABLET ORAL 4 TIMES DAILY PRN
Status: DISPENSED | OUTPATIENT
Start: 2023-09-15 | End: 2023-09-16

## 2023-09-15 RX ORDER — CHLORDIAZEPOXIDE HYDROCHLORIDE 25 MG/1
50 CAPSULE, GELATIN COATED ORAL 3 TIMES DAILY PRN
Status: DISCONTINUED | OUTPATIENT
Start: 2023-09-15 | End: 2023-09-15

## 2023-09-15 RX ORDER — CHLORDIAZEPOXIDE HYDROCHLORIDE 10 MG/1
10 CAPSULE, GELATIN COATED ORAL ONCE
Status: DISCONTINUED | OUTPATIENT
Start: 2023-09-18 | End: 2023-09-16 | Stop reason: HOSPADM

## 2023-09-15 RX ORDER — CYCLOBENZAPRINE HCL 10 MG
10 TABLET ORAL 3 TIMES DAILY PRN
Status: DISCONTINUED | OUTPATIENT
Start: 2023-09-15 | End: 2023-09-16 | Stop reason: HOSPADM

## 2023-09-15 RX ORDER — CHLORDIAZEPOXIDE HYDROCHLORIDE 10 MG/1
20 CAPSULE, GELATIN COATED ORAL 2 TIMES DAILY
Status: COMPLETED | OUTPATIENT
Start: 2023-09-15 | End: 2023-09-16

## 2023-09-15 RX ORDER — CLONIDINE HYDROCHLORIDE 0.1 MG/1
0.1 TABLET ORAL ONCE AS NEEDED
Status: DISCONTINUED | OUTPATIENT
Start: 2023-09-19 | End: 2023-09-16 | Stop reason: HOSPADM

## 2023-09-15 RX ORDER — CLONIDINE HYDROCHLORIDE 0.1 MG/1
0.1 TABLET ORAL 3 TIMES DAILY PRN
Status: DISCONTINUED | OUTPATIENT
Start: 2023-09-17 | End: 2023-09-16 | Stop reason: HOSPADM

## 2023-09-15 RX ORDER — CHLORDIAZEPOXIDE HYDROCHLORIDE 10 MG/1
10 CAPSULE, GELATIN COATED ORAL 2 TIMES DAILY
Status: DISCONTINUED | OUTPATIENT
Start: 2023-09-17 | End: 2023-09-16 | Stop reason: HOSPADM

## 2023-09-15 RX ORDER — CLONIDINE HYDROCHLORIDE 0.1 MG/1
0.1 TABLET ORAL 4 TIMES DAILY PRN
Status: DISCONTINUED | OUTPATIENT
Start: 2023-09-16 | End: 2023-09-16 | Stop reason: HOSPADM

## 2023-09-15 RX ORDER — CHLORDIAZEPOXIDE HYDROCHLORIDE 10 MG/1
40 CAPSULE, GELATIN COATED ORAL 3 TIMES DAILY PRN
Status: DISCONTINUED | OUTPATIENT
Start: 2023-09-15 | End: 2023-09-16 | Stop reason: HOSPADM

## 2023-09-15 RX ADMIN — CLONIDINE HYDROCHLORIDE 0.1 MG: 0.1 TABLET ORAL at 23:40

## 2023-09-15 RX ADMIN — CHLORDIAZEPOXIDE HYDROCHLORIDE 20 MG: 10 CAPSULE ORAL at 22:06

## 2023-09-16 ENCOUNTER — HOSPITAL ENCOUNTER (INPATIENT)
Facility: HOSPITAL | Age: 43
LOS: 3 days | Discharge: HOME | DRG: 897 | End: 2023-09-19
Attending: PSYCHIATRY & NEUROLOGY | Admitting: PSYCHIATRY & NEUROLOGY
Payer: MEDICAID

## 2023-09-16 VITALS
HEART RATE: 74 BPM | TEMPERATURE: 98.2 F | DIASTOLIC BLOOD PRESSURE: 73 MMHG | WEIGHT: 155 LBS | HEIGHT: 63 IN | OXYGEN SATURATION: 9 % | BODY MASS INDEX: 27.46 KG/M2 | SYSTOLIC BLOOD PRESSURE: 110 MMHG | RESPIRATION RATE: 18 BRPM

## 2023-09-16 PROBLEM — F17.200 NICOTINE USE DISORDER: Status: ACTIVE | Noted: 2023-09-16

## 2023-09-16 PROBLEM — F32.A DEPRESSION: Status: ACTIVE | Noted: 2023-09-16

## 2023-09-16 PROBLEM — F10.939 ALCOHOL WITHDRAWAL: Status: ACTIVE | Noted: 2023-09-16

## 2023-09-16 PROBLEM — F10.20 ALCOHOL DEPENDENCE: Status: ACTIVE | Noted: 2023-09-16

## 2023-09-16 LAB
AMPHET+METHAMPHET UR QL: POSITIVE
AMPHETAMINES UR QL: NEGATIVE
BACTERIA UR QL AUTO: ABNORMAL /HPF
BARBITURATES UR QL SCN: NEGATIVE
BENZODIAZ UR QL SCN: POSITIVE
BILIRUB UR QL STRIP: NEGATIVE
BUPRENORPHINE SERPL-MCNC: NEGATIVE NG/ML
CANNABINOIDS SERPL QL: NEGATIVE
CLARITY UR: CLEAR
COCAINE UR QL: NEGATIVE
COLOR UR: YELLOW
FENTANYL UR-MCNC: NEGATIVE NG/ML
GLUCOSE UR STRIP-MCNC: NEGATIVE MG/DL
HAV IGM SERPL QL IA: ABNORMAL
HBV CORE IGM SERPL QL IA: ABNORMAL
HBV SURFACE AG SERPL QL IA: ABNORMAL
HCV AB SER DONR QL: REACTIVE
HGB UR QL STRIP.AUTO: NEGATIVE
HYALINE CASTS UR QL AUTO: ABNORMAL /LPF
KETONES UR QL STRIP: NEGATIVE
LEUKOCYTE ESTERASE UR QL STRIP.AUTO: ABNORMAL
MAGNESIUM SERPL-MCNC: 2.2 MG/DL (ref 1.6–2.6)
METHADONE UR QL SCN: NEGATIVE
NITRITE UR QL STRIP: NEGATIVE
OPIATES UR QL: NEGATIVE
OXYCODONE UR QL SCN: NEGATIVE
PCP UR QL SCN: NEGATIVE
PH UR STRIP.AUTO: 6.5 [PH] (ref 5–8)
PROPOXYPH UR QL: NEGATIVE
PROT UR QL STRIP: NEGATIVE
QT INTERVAL: 400 MS
QTC INTERVAL: 478 MS
RBC # UR STRIP: ABNORMAL /HPF
REF LAB TEST METHOD: ABNORMAL
SP GR UR STRIP: 1.01 (ref 1–1.03)
SQUAMOUS #/AREA URNS HPF: ABNORMAL /HPF
TRICYCLICS UR QL SCN: NEGATIVE
UROBILINOGEN UR QL STRIP: ABNORMAL
WBC # UR STRIP: ABNORMAL /HPF

## 2023-09-16 PROCEDURE — 87147 CULTURE TYPE IMMUNOLOGIC: CPT | Performed by: EMERGENCY MEDICINE

## 2023-09-16 PROCEDURE — 80307 DRUG TEST PRSMV CHEM ANLYZR: CPT | Performed by: STUDENT IN AN ORGANIZED HEALTH CARE EDUCATION/TRAINING PROGRAM

## 2023-09-16 PROCEDURE — 96361 HYDRATE IV INFUSION ADD-ON: CPT

## 2023-09-16 PROCEDURE — 96374 THER/PROPH/DIAG INJ IV PUSH: CPT

## 2023-09-16 PROCEDURE — 81001 URINALYSIS AUTO W/SCOPE: CPT | Performed by: STUDENT IN AN ORGANIZED HEALTH CARE EDUCATION/TRAINING PROGRAM

## 2023-09-16 PROCEDURE — 99223 1ST HOSP IP/OBS HIGH 75: CPT | Performed by: PSYCHIATRY & NEUROLOGY

## 2023-09-16 PROCEDURE — 87086 URINE CULTURE/COLONY COUNT: CPT | Performed by: EMERGENCY MEDICINE

## 2023-09-16 PROCEDURE — 63710000001 ONDANSETRON PER 8 MG: Performed by: PSYCHIATRY & NEUROLOGY

## 2023-09-16 PROCEDURE — HZ2ZZZZ DETOXIFICATION SERVICES FOR SUBSTANCE ABUSE TREATMENT: ICD-10-PCS | Performed by: PSYCHIATRY & NEUROLOGY

## 2023-09-16 PROCEDURE — 96375 TX/PRO/DX INJ NEW DRUG ADDON: CPT

## 2023-09-16 PROCEDURE — 93005 ELECTROCARDIOGRAM TRACING: CPT | Performed by: PSYCHIATRY & NEUROLOGY

## 2023-09-16 PROCEDURE — 25010000002 LABETALOL 5 MG/ML SOLUTION: Performed by: STUDENT IN AN ORGANIZED HEALTH CARE EDUCATION/TRAINING PROGRAM

## 2023-09-16 PROCEDURE — 25010000002 ONDANSETRON PER 1 MG: Performed by: STUDENT IN AN ORGANIZED HEALTH CARE EDUCATION/TRAINING PROGRAM

## 2023-09-16 RX ORDER — HYDROXYZINE 50 MG/1
50 TABLET, FILM COATED ORAL EVERY 6 HOURS PRN
Status: DISCONTINUED | OUTPATIENT
Start: 2023-09-16 | End: 2023-09-19 | Stop reason: HOSPADM

## 2023-09-16 RX ORDER — BENZTROPINE MESYLATE 1 MG/ML
1 INJECTION INTRAMUSCULAR; INTRAVENOUS ONCE AS NEEDED
Status: DISCONTINUED | OUTPATIENT
Start: 2023-09-16 | End: 2023-09-19 | Stop reason: HOSPADM

## 2023-09-16 RX ORDER — CHLORDIAZEPOXIDE HYDROCHLORIDE 25 MG/1
25 CAPSULE, GELATIN COATED ORAL NIGHTLY
Status: CANCELLED | OUTPATIENT
Start: 2023-09-16

## 2023-09-16 RX ORDER — MULTIPLE VITAMINS W/ MINERALS TAB 9MG-400MCG
1 TAB ORAL DAILY
Status: DISCONTINUED | OUTPATIENT
Start: 2023-09-16 | End: 2023-09-19 | Stop reason: HOSPADM

## 2023-09-16 RX ORDER — ECHINACEA PURPUREA EXTRACT 125 MG
2 TABLET ORAL AS NEEDED
Status: DISCONTINUED | OUTPATIENT
Start: 2023-09-16 | End: 2023-09-19 | Stop reason: HOSPADM

## 2023-09-16 RX ORDER — ONDANSETRON 4 MG/1
4 TABLET, ORALLY DISINTEGRATING ORAL EVERY 6 HOURS PRN
Status: CANCELLED | OUTPATIENT
Start: 2023-09-16

## 2023-09-16 RX ORDER — LORAZEPAM 2 MG/1
2 TABLET ORAL EVERY 4 HOURS PRN
Status: ACTIVE | OUTPATIENT
Start: 2023-09-17 | End: 2023-09-18

## 2023-09-16 RX ORDER — FLUOXETINE 10 MG/1
10 CAPSULE ORAL DAILY
Status: DISCONTINUED | OUTPATIENT
Start: 2023-09-16 | End: 2023-09-19 | Stop reason: HOSPADM

## 2023-09-16 RX ORDER — ONDANSETRON 2 MG/ML
4 INJECTION INTRAMUSCULAR; INTRAVENOUS ONCE
Status: COMPLETED | OUTPATIENT
Start: 2023-09-16 | End: 2023-09-16

## 2023-09-16 RX ORDER — LORAZEPAM 2 MG/1
2 TABLET ORAL
Status: COMPLETED | OUTPATIENT
Start: 2023-09-17 | End: 2023-09-17

## 2023-09-16 RX ORDER — ALUMINA, MAGNESIA, AND SIMETHICONE 2400; 2400; 240 MG/30ML; MG/30ML; MG/30ML
15 SUSPENSION ORAL EVERY 6 HOURS PRN
Status: DISCONTINUED | OUTPATIENT
Start: 2023-09-16 | End: 2023-09-19 | Stop reason: HOSPADM

## 2023-09-16 RX ORDER — LORAZEPAM 2 MG/1
2 TABLET ORAL
Status: DISPENSED | OUTPATIENT
Start: 2023-09-16 | End: 2023-09-17

## 2023-09-16 RX ORDER — CEFDINIR 300 MG/1
300 CAPSULE ORAL ONCE
Status: COMPLETED | OUTPATIENT
Start: 2023-09-16 | End: 2023-09-16

## 2023-09-16 RX ORDER — MULTIVITAMIN WITH IRON
2 TABLET ORAL DAILY
Status: DISCONTINUED | OUTPATIENT
Start: 2023-09-16 | End: 2023-09-19 | Stop reason: HOSPADM

## 2023-09-16 RX ORDER — LORAZEPAM 0.5 MG/1
0.5 TABLET ORAL EVERY 4 HOURS PRN
Status: DISCONTINUED | OUTPATIENT
Start: 2023-09-20 | End: 2023-09-19 | Stop reason: HOSPADM

## 2023-09-16 RX ORDER — BENZTROPINE MESYLATE 1 MG/1
2 TABLET ORAL ONCE AS NEEDED
Status: DISCONTINUED | OUTPATIENT
Start: 2023-09-16 | End: 2023-09-19 | Stop reason: HOSPADM

## 2023-09-16 RX ORDER — LORAZEPAM 1 MG/1
1 TABLET ORAL EVERY 4 HOURS PRN
Status: DISCONTINUED | OUTPATIENT
Start: 2023-09-19 | End: 2023-09-19 | Stop reason: HOSPADM

## 2023-09-16 RX ORDER — LABETALOL HYDROCHLORIDE 5 MG/ML
10 INJECTION, SOLUTION INTRAVENOUS ONCE
Status: COMPLETED | OUTPATIENT
Start: 2023-09-16 | End: 2023-09-16

## 2023-09-16 RX ORDER — ONDANSETRON 4 MG/1
4 TABLET, FILM COATED ORAL EVERY 6 HOURS PRN
Status: DISCONTINUED | OUTPATIENT
Start: 2023-09-16 | End: 2023-09-19 | Stop reason: HOSPADM

## 2023-09-16 RX ORDER — CEFDINIR 300 MG/1
300 CAPSULE ORAL EVERY 12 HOURS SCHEDULED
Status: DISCONTINUED | OUTPATIENT
Start: 2023-09-16 | End: 2023-09-19 | Stop reason: HOSPADM

## 2023-09-16 RX ORDER — LORAZEPAM 2 MG/1
2 TABLET ORAL ONCE
Status: COMPLETED | OUTPATIENT
Start: 2023-09-16 | End: 2023-09-16

## 2023-09-16 RX ORDER — FAMOTIDINE 20 MG/1
20 TABLET, FILM COATED ORAL 2 TIMES DAILY PRN
Status: DISCONTINUED | OUTPATIENT
Start: 2023-09-16 | End: 2023-09-19 | Stop reason: HOSPADM

## 2023-09-16 RX ORDER — LORAZEPAM 1 MG/1
1 TABLET ORAL
Status: DISCONTINUED | OUTPATIENT
Start: 2023-09-19 | End: 2023-09-19 | Stop reason: HOSPADM

## 2023-09-16 RX ORDER — IBUPROFEN 400 MG/1
400 TABLET ORAL EVERY 6 HOURS PRN
Status: DISCONTINUED | OUTPATIENT
Start: 2023-09-16 | End: 2023-09-19 | Stop reason: HOSPADM

## 2023-09-16 RX ORDER — CHLORDIAZEPOXIDE HYDROCHLORIDE 25 MG/1
25 CAPSULE, GELATIN COATED ORAL NIGHTLY
COMMUNITY
End: 2023-09-19 | Stop reason: HOSPADM

## 2023-09-16 RX ORDER — TRAZODONE HYDROCHLORIDE 50 MG/1
50 TABLET ORAL NIGHTLY PRN
Status: DISCONTINUED | OUTPATIENT
Start: 2023-09-16 | End: 2023-09-19 | Stop reason: HOSPADM

## 2023-09-16 RX ORDER — LORAZEPAM 0.5 MG/1
0.5 TABLET ORAL
Status: DISCONTINUED | OUTPATIENT
Start: 2023-09-20 | End: 2023-09-19 | Stop reason: HOSPADM

## 2023-09-16 RX ORDER — BENZONATATE 100 MG/1
100 CAPSULE ORAL 3 TIMES DAILY PRN
Status: DISCONTINUED | OUTPATIENT
Start: 2023-09-16 | End: 2023-09-19 | Stop reason: HOSPADM

## 2023-09-16 RX ORDER — CLONIDINE HYDROCHLORIDE 0.1 MG/1
0.1 TABLET ORAL EVERY 8 HOURS PRN
Status: DISCONTINUED | OUTPATIENT
Start: 2023-09-16 | End: 2023-09-19 | Stop reason: HOSPADM

## 2023-09-16 RX ORDER — LOPERAMIDE HYDROCHLORIDE 2 MG/1
2 CAPSULE ORAL
Status: DISCONTINUED | OUTPATIENT
Start: 2023-09-16 | End: 2023-09-19 | Stop reason: HOSPADM

## 2023-09-16 RX ADMIN — FLUOXETINE HYDROCHLORIDE 10 MG: 10 CAPSULE ORAL at 17:30

## 2023-09-16 RX ADMIN — LORAZEPAM 2 MG: 2 TABLET ORAL at 21:22

## 2023-09-16 RX ADMIN — IBUPROFEN 400 MG: 400 TABLET, FILM COATED ORAL at 11:57

## 2023-09-16 RX ADMIN — CEFDINIR 300 MG: 300 CAPSULE ORAL at 15:50

## 2023-09-16 RX ADMIN — SODIUM CHLORIDE 2000 ML: 9 INJECTION, SOLUTION INTRAVENOUS at 02:00

## 2023-09-16 RX ADMIN — ONDANSETRON HYDROCHLORIDE 4 MG: 4 TABLET, FILM COATED ORAL at 21:22

## 2023-09-16 RX ADMIN — LORAZEPAM 2 MG: 2 TABLET ORAL at 08:06

## 2023-09-16 RX ADMIN — LORAZEPAM 2 MG: 2 TABLET ORAL at 11:51

## 2023-09-16 RX ADMIN — CEFDINIR 300 MG: 300 CAPSULE ORAL at 21:22

## 2023-09-16 RX ADMIN — CLONIDINE HYDROCHLORIDE 0.1 MG: 0.1 TABLET ORAL at 07:26

## 2023-09-16 RX ADMIN — LABETALOL HYDROCHLORIDE 10 MG: 5 INJECTION, SOLUTION INTRAVENOUS at 01:53

## 2023-09-16 RX ADMIN — Medication 1 TABLET: at 11:55

## 2023-09-16 RX ADMIN — TRAZODONE HYDROCHLORIDE 50 MG: 50 TABLET ORAL at 21:22

## 2023-09-16 RX ADMIN — Medication 2 TABLET: at 11:56

## 2023-09-16 RX ADMIN — CLONIDINE HYDROCHLORIDE 0.1 MG: 0.1 TABLET ORAL at 11:41

## 2023-09-16 RX ADMIN — Medication 100 MG: at 11:56

## 2023-09-16 RX ADMIN — LORAZEPAM 2 MG: 2 TABLET ORAL at 06:47

## 2023-09-16 RX ADMIN — CHLORDIAZEPOXIDE HYDROCHLORIDE 20 MG: 10 CAPSULE ORAL at 05:36

## 2023-09-16 RX ADMIN — CEFDINIR 300 MG: 300 CAPSULE ORAL at 08:06

## 2023-09-16 RX ADMIN — ONDANSETRON 4 MG: 2 INJECTION INTRAMUSCULAR; INTRAVENOUS at 01:57

## 2023-09-16 NOTE — PLAN OF CARE
Goal Outcome Evaluation:  Plan of Care Reviewed With: patient  Patient Agreement with Plan of Care: agrees     Progress: no change  Outcome Evaluation: New patient admitted to Detox unit at 0950 this morning.

## 2023-09-16 NOTE — NURSING NOTE
Spoke to Dr. Brown via phone. Instructed him that pt will be admitted but we are waiting on staff to be called in for the unit. Expressed concern that she may have to wait a little while. Instructed with her drinking history, ok to give one more dose of ativan while waiting. Information verbalized.

## 2023-09-16 NOTE — NURSING NOTE
Warm blanket provided to pt. Patient reports feeling some better and wants to lie down. Informed her that staff would let us know when her bed is ready.

## 2023-09-16 NOTE — NURSING NOTE
Pt to intake. Search completed with 2 staff members present. Pt educated about mask and encouraged to keep mask on in intake area if having signs/symptoms of COVID. Items placed in cabinet.

## 2023-09-16 NOTE — NURSING NOTE
CIWA 11. Pt reporting headache of 7 on 0-10 scale, sweating on her back, anxiety 7 on 0-10 scale, and slight tremor in her hands. Report given to BRADFORD Hess.

## 2023-09-16 NOTE — NURSING NOTE
Patient presents to intake and reports that she has to stop drinking. She reports that has been drinking all day and is up to 1.5 to 2 5th of vodka a day. She reports that she needs to quit drinking and wants to go to detox. She also reports hx of withdrawals and has had delirium in the past when coming off alcohol. The last time she detoxed about a little over a year ago. Or maybe two. Patient denies use of any other drug. And is on adipex for weight loss. CIWA 13. Patient noted to be shaky. ER provider made aware of CIWA and elevated /117. PRN ativan given per provider request. Patient denies any SI HI or AVH.
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
## 2023-09-16 NOTE — NURSING NOTE
Called and reviewed information with Dr. Love. Intake information provided. And all VS and reviewed Mar and meds given. Instructed to admit to detox. ER provider reports that urine looks like she may be getting a uti. Omnicef 300 mg po bid for seven days. Called and made Dr. Love aware. Instructed ok to order. And to start the ativan detox and order clonidine prn as needed as well. Admission orders received. [id number]

## 2023-09-16 NOTE — PLAN OF CARE
Problem: Adult Behavioral Health Plan of Care  Goal: Plan of Care Review  Outcome: Ongoing, Progressing  Flowsheets (Taken 9/16/2023 1302)  Consent Given to Review Plan with: declines  Progress: no change  Plan of Care Reviewed With: patient  Patient Agreement with Plan of Care: agrees  Outcome Evaluation: New admit. Completed social history and integrated summary  Goal: Patient-Specific Goal (Individualization)  Outcome: Ongoing, Progressing  Flowsheets  Taken 9/16/2023 1302  Patient-Specific Goals (Include Timeframe): Patient will deny SI/HI prior to discharge. Patient will identify 1 healthy coping skill for recovery prior to discharge. Patient will consent to appropriate aftercare plan prior to discharge.  Individualized Care Needs: Therapist will offer 1-4 individual sessions, family education, aftercare planning  Anxieties, Fears or Concerns: none reported  Taken 9/16/2023 1256  Patient Personal Strengths:   expressive of emotions   expressive of needs   motivated for recovery   motivated for treatment   realistic evaluation of current/future capabilities   positive vocational history   resourceful   resilient   stable living environment   socioeconomic stability   positive educational history   intellectual cognitive skills   humor  Patient Vulnerabilities:   adverse childhood experience(s)   history of unsuccessful treatment   poor impulse control   family/relationship conflict   substance abuse/addiction  Goal: Optimized Coping Skills in Response to Life Stressors  Outcome: Ongoing, Progressing  Intervention: Promote Effective Coping Strategies  Flowsheets (Taken 9/16/2023 1157 by Camille Tolbert RN)  Supportive Measures:   verbalization of feelings encouraged   active listening utilized   positive reinforcement provided   self-care encouraged  Goal: Develops/Participates in Therapeutic [place] to Support Successful Transition  Outcome: Ongoing, Progressing  Intervention: Foster Therapeutic  Andrew  Flowsheets (Taken 9/16/2023 1157 by Camille Tolbert, RN)  Trust Relationship/Rapport:   thoughts/feelings acknowledged   reassurance provided   questions encouraged   questions answered   empathic listening provided   emotional support provided   choices provided   care explained  Intervention: Mutually Develop Transition Plan  Flowsheets  Taken 9/16/2023 1302  Outpatient/Agency/Support Group Needs:   outpatient counseling   outpatient psychiatric care (specify)   outpatient medication management   outpatient substance abuse treatment (specify)  Transition Support:   community resources reviewed   crisis management plan verbalized   follow-up care coordinated   crisis management plan promoted   follow-up care discussed  Anticipated Discharge Disposition: home or self-care  Taken 9/16/2023 1300  Discharge Coordination/Progress: Patient is medicaid pending and is agreeable to [facility] referral.  Concerns Comments: NA  Transportation Anticipated: car, drives self  Transportation Concerns: no car  Current Discharge Risk:   psychiatric illness   substance use/abuse  Concerns to be Addressed:   mental health   discharge planning   coping/stress   substance/tobacco abuse/use  Readmission Within the Last 30 Days: no previous admission in last 30 days  Patient/Family Anticipated Services at Transition:   outpatient care   mental health services  Patient's Choice of Community Agency(s): [facility]  Patient/Family Anticipates Transition to: home  Offered/Gave Vendor List: no   Goal Outcome Evaluation:  Plan of Care Reviewed With: patient  Patient Agreement with Plan of Care: agrees  Consent Given to Review Plan with: declines  Progress: no change  Outcome Evaluation: New admit. Completed social history and integrated summary

## 2023-09-16 NOTE — PROGRESS NOTES
MEDICAL SCREENING:    Reason for Visit: acute ETOH abuse, detox    Patient initially seen in triage.  The patient was advised further evaluation and diagnostic testing will be needed, some of the treatment and testing will be initiated in the lobby in order to begin the process.  The patient will be returned to the waiting area for the time being and possibly be re-assessed by a subsequent ED provider.  The patient will be brought back to the treatment area in as timely manner as possible.

## 2023-09-16 NOTE — NURSING NOTE
Patient reports to staff that high blood pressure is the first thing she goes thru when trying to come off alcohol. At this time she states that the ativan has helped but knows her blood pressure will stay high a while. She states that she tried to tell the other staff that ativan is really the only thing that helps. But they wanted to do the other stuff .

## 2023-09-16 NOTE — ED PROVIDER NOTES
Subjective   History of Present Illness  Patient is a 43-year-old female with history significant for chronic alcohol abuse who comes to the ER for alcohol detox.  She says she drinks approximately 2 5ths of the [place]avars[place] vodka daily.  She says her last drink was approximately 3 hours prior to presentation.  States she has felt delirious in the past but no known DTs.  No withdrawals seizures in the past.  Complains of nausea.  No other complaints.    Review of Systems   Constitutional:  Negative for chills, fatigue and fever.   HENT:  Negative for ear pain, sinus pain and sore throat.    Respiratory:  Negative for cough, chest tightness, shortness of breath and wheezing.    Cardiovascular:  Negative for chest pain, palpitations and leg swelling.   Gastrointestinal:  Positive for nausea. Negative for abdominal pain, constipation, diarrhea and vomiting.   Genitourinary:  Negative for dysuria, hematuria and urgency.   Musculoskeletal:  Negative for arthralgias and myalgias.   Neurological:  Negative for dizziness, syncope and light-headedness.   Psychiatric/Behavioral:  Negative for confusion.      Past Medical History:   Diagnosis Date    Alcohol abuse     Delirium     Kidney stone     Urinary tract infection     Withdrawal symptoms, alcohol        Allergies   Allergen Reactions    Reglan [Metoclopramide] Rash       Past Surgical History:   Procedure Laterality Date     SECTION  ;      SECTION      CHOLECYSTECTOMY  2012    CYSTOSCOPY BLADDER STONE LITHOTRIPSY  2012    DILATION AND CURETTAGE, DIAGNOSTIC / THERAPEUTIC  2012       Family History   Problem Relation Age of Onset    Cancer Maternal Grandmother     Heart disease Maternal Grandmother     Stroke Maternal Grandmother     Cancer Paternal Grandfather     Heart disease Paternal Grandfather     Diabetes Paternal Grandfather        Social History     Socioeconomic History    Marital status:    Tobacco Use    Smoking status: Never    Vaping Use    Vaping Use: Every day    Substances: Nicotine    Devices: Disposable   Substance and Sexual Activity    Alcohol use: Yes     Comment: see below    Drug use: Yes     Comment: etoh    Sexual activity: Yes     Partners: Male     Birth control/protection: Tubal ligation           Objective   Physical Exam  Vitals and nursing note reviewed. Exam conducted with a chaperone present.   Constitutional:       Appearance: Normal appearance. She is normal weight.   HENT:      Head: Normocephalic and atraumatic.      Nose: Nose normal.      Mouth/Throat:      Mouth: Mucous membranes are moist.      Pharynx: Oropharynx is clear.   Eyes:      Extraocular Movements: Extraocular movements intact.      Conjunctiva/sclera: Conjunctivae normal.      Pupils: Pupils are equal, round, and reactive to light.   Cardiovascular:      Rate and Rhythm: Regular rhythm. Tachycardia present.      Pulses: Normal pulses.      Heart sounds: Normal heart sounds.   Pulmonary:      Effort: Pulmonary effort is normal.      Breath sounds: Normal breath sounds.   Abdominal:      General: Bowel sounds are normal.      Palpations: Abdomen is soft.   Musculoskeletal:         General: Normal range of motion.      Cervical back: Normal range of motion and neck supple.   Skin:     General: Skin is warm and dry.      Capillary Refill: Capillary refill takes less than 2 seconds.   Neurological:      General: No focal deficit present.      Mental Status: She is alert and oriented to person, place, and time.   Psychiatric:         Mood and Affect: Mood normal.         Behavior: Behavior normal.       Procedures  Results for orders placed or performed during the hospital encounter of 09/15/23   Comprehensive Metabolic Panel    Specimen: Blood   Result Value Ref Range    Glucose 97 65 - 99 mg/dL    BUN 10 6 - 20 mg/dL    Creatinine 0.88 0.57 - 1.00 mg/dL    Sodium 137 136 - 145 mmol/L    Potassium 4.7 3.5 - 5.2 mmol/L    Chloride 105 98 - 107 mmol/L     CO2 21.9 (L) 22.0 - 29.0 mmol/L    Calcium 9.8 8.6 - 10.5 mg/dL    Total Protein 8.0 6.0 - 8.5 g/dL    Albumin 4.2 3.5 - 5.2 g/dL    ALT (SGPT) 32 1 - 33 U/L    AST (SGOT) 55 (H) 1 - 32 U/L    Alkaline Phosphatase 87 39 - 117 U/L    Total Bilirubin 0.3 0.0 - 1.2 mg/dL    Globulin 3.8 gm/dL    A/G Ratio 1.1 g/dL    BUN/Creatinine Ratio 11.4 7.0 - 25.0    Anion Gap 10.1 5.0 - 15.0 mmol/L    eGFR 84.8 >60.0 mL/min/1.73   Acetaminophen Level    Specimen: Blood   Result Value Ref Range    Acetaminophen <5.0 0.0 - 30.0 mcg/mL   Ethanol    Specimen: Blood   Result Value Ref Range    Ethanol <10 0 - 10 mg/dL    Ethanol % <0.010 %   Salicylate Level    Specimen: Blood   Result Value Ref Range    Salicylate <0.3 <=30.0 mg/dL   Urine Drug Screen - Urine, Clean Catch    Specimen: Urine, Clean Catch   Result Value Ref Range    THC, Screen, Urine Negative Negative    Phencyclidine (PCP), Urine Negative Negative    Cocaine Screen, Urine Negative Negative    Methamphetamine, Ur Negative Negative    Opiate Screen Negative Negative    Amphetamine Screen, Urine Positive (A) Negative    Benzodiazepine Screen, Urine Positive (A) Negative    Tricyclic Antidepressants Screen Negative Negative    Methadone Screen, Urine Negative Negative    Barbiturates Screen, Urine Negative Negative    Oxycodone Screen, Urine Negative Negative    Propoxyphene Screen Negative Negative    Buprenorphine, Screen, Urine Negative Negative   hCG, Serum, Qualitative    Specimen: Blood   Result Value Ref Range    HCG Qualitative Negative Negative   TSH    Specimen: Blood   Result Value Ref Range    TSH 1.480 0.270 - 4.200 uIU/mL   Protime-INR    Specimen: Blood   Result Value Ref Range    Protime 11.9 (L) 12.1 - 14.7 Seconds    INR 0.83 (L) 0.90 - 1.10   CBC Auto Differential    Specimen: Blood   Result Value Ref Range    WBC 6.79 3.40 - 10.80 10*3/mm3    RBC 4.54 3.77 - 5.28 10*6/mm3    Hemoglobin 12.8 12.0 - 15.9 g/dL    Hematocrit 39.4 34.0 - 46.6 %    MCV  86.8 79.0 - 97.0 fL    MCH 28.2 26.6 - 33.0 pg    MCHC 32.5 31.5 - 35.7 g/dL    RDW 14.4 12.3 - 15.4 %    RDW-SD 45.0 37.0 - 54.0 fl    MPV 8.8 6.0 - 12.0 fL    Platelets 347 140 - 450 10*3/mm3    Neutrophil % 70.9 42.7 - 76.0 %    Lymphocyte % 17.2 (L) 19.6 - 45.3 %    Monocyte % 7.5 5.0 - 12.0 %    Eosinophil % 3.5 0.3 - 6.2 %    Basophil % 0.6 0.0 - 1.5 %    Immature Grans % 0.3 0.0 - 0.5 %    Neutrophils, Absolute 4.81 1.70 - 7.00 10*3/mm3    Lymphocytes, Absolute 1.17 0.70 - 3.10 10*3/mm3    Monocytes, Absolute 0.51 0.10 - 0.90 10*3/mm3    Eosinophils, Absolute 0.24 0.00 - 0.40 10*3/mm3    Basophils, Absolute 0.04 0.00 - 0.20 10*3/mm3    Immature Grans, Absolute 0.02 0.00 - 0.05 10*3/mm3    nRBC 0.0 0.0 - 0.2 /100 WBC   Magnesium    Specimen: Blood   Result Value Ref Range    Magnesium 2.2 1.6 - 2.6 mg/dL   Urinalysis With Microscopic If Indicated (No Culture) - Urine, Clean Catch    Specimen: Urine, Clean Catch   Result Value Ref Range    Color, UA Yellow Yellow, Straw    Appearance, UA Clear Clear    pH, UA 6.5 5.0 - 8.0    Specific Gravity, UA 1.014 1.005 - 1.030    Glucose, UA Negative Negative    Ketones, UA Negative Negative    Bilirubin, UA Negative Negative    Blood, UA Negative Negative    Protein, UA Negative Negative    Leuk Esterase, UA Small (1+) (A) Negative    Nitrite, UA Negative Negative    Urobilinogen, UA 0.2 E.U./dL 0.2 - 1.0 E.U./dL   Fentanyl, Urine - Urine, Clean Catch    Specimen: Urine, Clean Catch   Result Value Ref Range    Fentanyl, Urine Negative Negative   Urinalysis, Microscopic Only - Urine, Clean Catch    Specimen: Urine, Clean Catch   Result Value Ref Range    RBC, UA 0-2 None Seen, 0-2 /HPF    WBC, UA Too Numerous to Count (A) None Seen, 0-2 /HPF    Bacteria, UA 1+ (A) None Seen /HPF    Squamous Epithelial Cells, UA 3-6 (A) None Seen, 0-2 /HPF    Hyaline Casts, UA None Seen None Seen /LPF    Methodology Automated Microscopy    POC Glucose Once    Specimen: Blood   Result Value  Ref Range    Glucose 106 70 - 130 mg/dL   Green Top (Gel)   Result Value Ref Range    Extra Tube Hold for add-ons.    Lavender Top   Result Value Ref Range    Extra Tube hold for add-on    Light Blue Top   Result Value Ref Range    Extra Tube Hold for add-ons.               ED Course  ED Course as of 09/16/23 0804   Sat Sep 16, 2023   0432 BP(!): 192/122 [SF]   0643 I assumed care from Dr. Rodriguez.  Patient is being evaluated by behavioral health. [SF]   0643 Care of this patient was transferred to the next attending physician at shift change.  Complete discussion of presentation, labs, imaging, care, and expected course occurred during transition of providers.  Vitals stable at transfer of care.    Electronically signed by Cedrick Shanks DO, 09/16/23, 6:43 AM EDT.   [SF]   0752 I assumed patient's care from Dr. Shanks this morning at shift change.  Please see his documentation above.  Patient has been evaluated by psychiatry intake.  She is being admitted to the detox unit/discharged to behavioral health.  I have added a urine culture.  I have advised psychiatry intake to give the patient Omnicef 300 mg twice daily for 7 days, pending the culture report. [CM]   0803 Most recent vitals show a blood pressure of 143/100, pulse 83. [CM]      ED Course User Index  [CM] Lino Brown MD  [SF] Cedrick Shanks DO                                           Medical Decision Making  --Patient was hypertensive and tachycardic on arrival.  Improved after IV fluids and as needed clonidine.  Did give IV labetalol x1.  Labs overall unremarkable, alcohol negative  --As needed Librium per protocol  --Eval by psych, plan for detox    Problems Addressed:  Alcoholic intoxication without complication: complicated acute illness or injury    Amount and/or Complexity of Data Reviewed  Labs: ordered. Decision-making details documented in ED Course.    Risk  Prescription drug management.        Final diagnoses:   Alcoholism       ED  Disposition  ED Disposition       ED Disposition   DC/Transfer to Behavioral Health    Condition   Stable    Comment   --               Please note that portions of this note were completed with a voice recognition program.                Lino Brown MD  09/16/23 0877

## 2023-09-16 NOTE — NURSING NOTE
Dr. Love aware of pt's /114, . Advised to administer Clonidine 0.1mg early. YUKO Esteban RN made aware.

## 2023-09-17 LAB — BACTERIA SPEC AEROBE CULT: ABNORMAL

## 2023-09-17 PROCEDURE — 99232 SBSQ HOSP IP/OBS MODERATE 35: CPT | Performed by: PSYCHIATRY & NEUROLOGY

## 2023-09-17 RX ORDER — SUMATRIPTAN 50 MG/1
50 TABLET, FILM COATED ORAL
Status: DISCONTINUED | OUTPATIENT
Start: 2023-09-17 | End: 2023-09-19 | Stop reason: HOSPADM

## 2023-09-17 RX ADMIN — Medication 2 TABLET: at 09:16

## 2023-09-17 RX ADMIN — Medication 1 TABLET: at 09:16

## 2023-09-17 RX ADMIN — CLONIDINE HYDROCHLORIDE 0.1 MG: 0.1 TABLET ORAL at 04:47

## 2023-09-17 RX ADMIN — FLUOXETINE HYDROCHLORIDE 10 MG: 10 CAPSULE ORAL at 09:17

## 2023-09-17 RX ADMIN — HYDROXYZINE HYDROCHLORIDE 50 MG: 50 TABLET ORAL at 21:19

## 2023-09-17 RX ADMIN — LORAZEPAM 2 MG: 2 TABLET ORAL at 09:15

## 2023-09-17 RX ADMIN — TRAZODONE HYDROCHLORIDE 50 MG: 50 TABLET ORAL at 21:19

## 2023-09-17 RX ADMIN — IBUPROFEN 400 MG: 400 TABLET, FILM COATED ORAL at 09:16

## 2023-09-17 RX ADMIN — SUMATRIPTAN SUCCINATE 50 MG: 50 TABLET ORAL at 13:09

## 2023-09-17 RX ADMIN — LORAZEPAM 2 MG: 2 TABLET ORAL at 14:36

## 2023-09-17 RX ADMIN — CEFDINIR 300 MG: 300 CAPSULE ORAL at 21:18

## 2023-09-17 RX ADMIN — Medication 100 MG: at 09:15

## 2023-09-17 RX ADMIN — CEFDINIR 300 MG: 300 CAPSULE ORAL at 09:17

## 2023-09-17 RX ADMIN — LORAZEPAM 2 MG: 2 TABLET ORAL at 21:19

## 2023-09-17 NOTE — PROGRESS NOTES
"INPATIENT PSYCHIATRIC PROGRESS NOTE    Name:  Gee Morales  :  1980  MRN:  [id number]  Visit Number:  [id number]  Length of stay:  1    SUBJECTIVE    CC/Focus of Exam: alcohol use    INTERVAL HISTORY:  The patient reports she is experiencing migraine headache and she takes sumatriptan.   She reports her withdrawals are not so bad.   Depression rating 5/10  Anxiety rating 5/10  Sleep:good  Withdrawal sx: tremors  Cravin/10    Review of Systems   Neurological:  Positive for tremors and headaches.     OBJECTIVE    Temp:  [96.4 °F (35.8 °C)-98.2 °F (36.8 °C)] 97.6 °F (36.4 °C)  Heart Rate:  [] 102  Resp:  [16-18] 18  BP: (125-168)/() 145/103    MENTAL STATUS EXAM:  Appearance:Casually dressed, good hygeine.   Cooperation:Cooperative  Psychomotor: No psychomotor agitation/retardation, No EPS, No motor tics  Speech-normal rate, amount.  Mood \"better\"   Affect-congruent, appropriate, stable  Thought Content-goal directed, no delusional material present  Thought process-linear, organized.  Suicidality: No SI  Homicidality: No HI  Perception: No AH/VH  Insight-fair   Judgement-fair    Lab Results (last 24 hours)       Procedure Component Value Units Date/Time    Hepatitis Panel, Acute [[id number]]  (Abnormal) Collected: 09/15/23 2116    Specimen: Blood Updated: 23 1338     Hepatitis B Surface Ag Non-Reactive     Hep A IgM Non-Reactive     Hep B C IgM Non-Reactive     Hepatitis C Ab Reactive    Narrative:      Results may be falsely decreased if patient taking Biotin.                Imaging Results (Last 24 Hours)       ** No results found for the last 24 hours. **               ECG/EMG Results (most recent)       Procedure Component Value Units Date/Time    ECG 12 Lead Other; Baseline Cardiac Status [[id number]] Collected: 23 1725     Updated: 23 1827     QT Interval 400 ms      QTC Interval 478 ms     Narrative:      Test Reason : Other~  Blood Pressure :   */*   mmHG  Vent. Rate :  " 86 BPM     Atrial Rate :  86 BPM     P-R Int : 136 ms          QRS Dur :  96 ms      QT Int : 400 ms       P-R-T Axes :  34  -8  15 degrees     QTc Int : 478 ms    Normal sinus rhythm  Normal ECG  Confirmed by Ben Arana ([id number]) on 9/16/2023 6:27:15 PM    Referred By: ALFREDO           Confirmed By: Ben Arana             ALLERGIES: Reglan [metoclopramide]    Medication Review:   Scheduled Medications:  B-complex with vitamin C, 2 tablet, Oral, Daily  cefdinir, 300 mg, Oral, Q12H  FLUoxetine, 10 mg, Oral, Daily  LORazepam, 2 mg, Oral, 3 times per day   Followed by  [START ON 9/18/2023] LORazepam, 1.5 mg, Oral, 3 times per day   Followed by  [START ON 9/19/2023] LORazepam, 1 mg, Oral, 3 times per day   Followed by  [START ON 9/20/2023] LORazepam, 0.5 mg, Oral, 3 times per day  multivitamin with minerals, 1 tablet, Oral, Daily  thiamine, 100 mg, Oral, Daily         PRN Medications:    aluminum-magnesium hydroxide-simethicone    benzonatate    benztropine **OR** benztropine    cloNIDine    famotidine    hydrOXYzine    ibuprofen    loperamide    LORazepam **FOLLOWED BY** [START ON 9/18/2023] LORazepam **FOLLOWED BY** [START ON 9/19/2023] LORazepam **FOLLOWED BY** [START ON 9/20/2023] LORazepam    LORazepam    magnesium hydroxide    ondansetron    sodium chloride    traZODone   All medications reviewed.    ASSESSMENT & PLAN:      Alcohol use disorder, severe, dependence    Alcohol withdrawal  -Ativan detox  -Thiamine and folate       Depressive disorder unspecified  -Start fluoxetine       Nicotine use disorder  -Encouraged cessation. Discussed risks of nicotine use. The patient agreed to do nicotine patches.        UTI  -Cefdinir       Hep C positive  -Patient reports she has been treated for it about 2 years ago. \      Migraine headache  -Sumatriptan 50 mg prn     Special precautions: Special Precautions Level 4 (q30 min checks).    Behavioral Health Treatment Plan and Problem List: I have reviewed and  approved the Behavioral Health Treatment Plan and Problem list.  The patient has had a chance to review and agrees with the treatment plan.    Copied text in portions of this note has been reviewed and is accurate as of 09/17/23         Clinician:  Ashlie Love MD  09/17/23  11:12 EDT

## 2023-09-17 NOTE — PLAN OF CARE
Goal Outcome Evaluation:  Plan of Care Reviewed With: patient  Patient Agreement with Plan of Care: agrees     Progress: improving  Outcome Evaluation: Pt has been calm and cooperative, rates anxiety 7, depression 7. Pt states sleep and appetite are good, denies SI/HI/AVH. Pt rates cravings 7, states her only withdrawal symptoms are tremors and headache. Pt voices no other complaints to this RN.

## 2023-09-17 NOTE — PLAN OF CARE
Goal Outcome Evaluation:  Plan of Care Reviewed With: patient  Patient Agreement with Plan of Care: agrees     Progress: improving  Outcome Evaluation: Patient has been calm and cooperative during this shift. She spends free time in day room watching TV. She rates anxiety 7, depression 7, and cravings 9. Patient reports witdrawl symptoms of Headache, Anxiety, depression, and tremors. CIWA- 10. Will continue to monitor.

## 2023-09-18 PROCEDURE — 99232 SBSQ HOSP IP/OBS MODERATE 35: CPT | Performed by: PSYCHIATRY & NEUROLOGY

## 2023-09-18 RX ORDER — NALTREXONE HYDROCHLORIDE 50 MG/1
50 TABLET, FILM COATED ORAL DAILY
Status: DISCONTINUED | OUTPATIENT
Start: 2023-09-18 | End: 2023-09-19 | Stop reason: HOSPADM

## 2023-09-18 RX ADMIN — Medication 1 TABLET: at 08:24

## 2023-09-18 RX ADMIN — NALTREXONE HYDROCHLORIDE 50 MG: 50 TABLET, FILM COATED ORAL at 12:03

## 2023-09-18 RX ADMIN — Medication 2 TABLET: at 08:24

## 2023-09-18 RX ADMIN — FLUOXETINE HYDROCHLORIDE 10 MG: 10 CAPSULE ORAL at 08:24

## 2023-09-18 RX ADMIN — HYDROXYZINE HYDROCHLORIDE 50 MG: 50 TABLET ORAL at 08:25

## 2023-09-18 RX ADMIN — CEFDINIR 300 MG: 300 CAPSULE ORAL at 21:15

## 2023-09-18 RX ADMIN — HYDROXYZINE HYDROCHLORIDE 50 MG: 50 TABLET ORAL at 21:15

## 2023-09-18 RX ADMIN — LORAZEPAM 1.5 MG: 0.5 TABLET ORAL at 15:23

## 2023-09-18 RX ADMIN — CEFDINIR 300 MG: 300 CAPSULE ORAL at 08:24

## 2023-09-18 RX ADMIN — LORAZEPAM 1.5 MG: 0.5 TABLET ORAL at 21:15

## 2023-09-18 RX ADMIN — LORAZEPAM 1.5 MG: 0.5 TABLET ORAL at 08:25

## 2023-09-18 RX ADMIN — TRAZODONE HYDROCHLORIDE 50 MG: 50 TABLET ORAL at 21:15

## 2023-09-18 RX ADMIN — Medication 100 MG: at 08:24

## 2023-09-18 NOTE — PLAN OF CARE
Goal Outcome Evaluation:  Plan of Care Reviewed With: patient  Patient Agreement with Plan of Care: agrees     Progress: improving  Outcome Evaluation: Patient has been calm and cooperative during this shift. She is interactive in groups and with peers and spends free time in day room watching TV. Patient rates anxiety 8, depression 8, and cravings 10. She reports withdrawl symptoms as Anxiety, depression, Tremors, Headache, and Burning sensation on skin. Patient scored CIWA 12. Patient voices no other complaints at this time. Will continue to monitor.

## 2023-09-18 NOTE — PLAN OF CARE
Goal Outcome Evaluation:        Problem: Adult Behavioral Health Plan of Care  Goal: Patient-Specific Goal (Individualization)  Outcome: Ongoing, Progressing  Flowsheets  Taken 9/16/2023 1302 by Mitali Souza  Patient-Specific Goals (Include Timeframe): Patient will deny SI/HI prior to discharge. Patient will identify 1 healthy coping skill for recovery prior to discharge. Patient will consent to appropriate aftercare plan prior to discharge.  Individualized Care Needs: Therapist will offer 1-4 individual sessions, family education, aftercare planning  Anxieties, Fears or Concerns: none reported  Taken 9/16/2023 1256 by Mitali Souza  Patient Personal Strengths:   expressive of emotions   expressive of needs   motivated for recovery   motivated for treatment   realistic evaluation of current/future capabilities   positive vocational history   resourceful   resilient   stable living environment   socioeconomic stability   positive educational history   intellectual cognitive skills   humor  Patient Vulnerabilities:   adverse childhood experience(s)   history of unsuccessful treatment   poor impulse control   family/relationship conflict   substance abuse/addiction  Goal: Optimized Coping Skills in Response to Life Stressors  Outcome: Ongoing, Progressing  Flowsheets (Taken 9/18/2023 1024)  Optimized Coping Skills in Response to Life Stressors: making progress toward outcome  Intervention: Promote Effective Coping Strategies  Flowsheets (Taken 9/18/2023 1024)  Supportive Measures:   active listening utilized   counseling provided   decision-making supported   goal-setting facilitated   verbalization of feelings encouraged   self-responsibility promoted   positive reinforcement provided   self-reflection promoted   self-care encouraged  Goal: Develops/Participates in Therapeutic [place] to Support Successful Transition  Outcome: Ongoing, Progressing  Flowsheets (Taken 9/18/2023  1024)  Develops/Participates in Therapeutic [place] to Support Successful Transition: making progress toward outcome  Intervention: Foster Therapeutic [place]  Flowsheets (Taken 9/18/2023 1024)  Trust Relationship/Rapport:   care explained   questions encouraged   choices provided   reassurance provided   thoughts/feelings acknowledged   emotional support provided   empathic listening provided   questions answered  Intervention: Mutually Develop Transition Plan  Flowsheets  Taken 9/18/2023 1023 by Elvie Valladares CSW  Discharge Coordination/Progress: Patient is Medicaid pending. Patient plans to return home and follow up with the [facility] for aftercare.  Transportation Anticipated: car, drives self  Transportation Concerns: none  Current Discharge Risk:   psychiatric illness   substance use/abuse  Concerns to be Addressed:   mental health   discharge planning   coping/stress   substance/tobacco abuse/use  Readmission Within the Last 30 Days: no previous admission in last 30 days  Patient/Family Anticipated Services at Transition:   mental health services   outpatient care  Patient's Choice of Community Agency(s): [facility]  Patient/Family Anticipates Transition to: home  Offered/Gave Vendor List: no  Taken 9/16/2023 1302 by Mitali Souza  Outpatient/Agency/Support Group Needs:   outpatient counseling   outpatient psychiatric care (specify)   outpatient medication management   outpatient substance abuse treatment (specify)  Transition Support:   community resources reviewed   crisis management plan verbalized   follow-up care coordinated   crisis management plan promoted   follow-up care discussed  Anticipated Discharge Disposition: home or self-care  Taken 9/16/2023 1300 by Mitali Souza  Concerns Comments: NA      DATA:  Therapist met with Patient individually this date. Patient agreeable to discuss current treatment progress and discharge concerns.     CLINICAL  MANUVERING/INTERVENTIONS:  Assisted Patient in processing session content; acknowledged and normalized Patient’s thoughts, feelings, and concerns by utilizing a person-centered approach in efforts to build appropriate rapport and a positive therapeutic relationship with open and honest communication. Allowed Patient to ventilate regarding current stressors and triggers for negative emotions and thoughts in a safe nonjudgmental environment with unconditional positive regard, active listening skills, and empathy. Therapist implemented motivational interviewing techniques to assist Patient with exploring personal growth and change and discussed distress tolerance skills, self soothing techniques, and applied cognitive behavioral strategies to facilitate identification of maladaptive patterns of thinking and behavior.Therapist utilized dialectical behavior techniques to teach and model emotional regulation and relaxation methods. Therapist assisted Patient with identifying and implementing healthier coping strategies. Therapist assisted Patient with safety planning; Patient agreed to continue honest communication with Treatment Team while inpatient and identify any SI/HI.  Patient encouraged to seek nearest ER or contact 911 if danger to self or others post discharge.     ASSESSMENT:    Patient continues to receive treatment for alcohol detox. Patient reports moderate anxiety and depression, denies current SI/HI/AVH. Patient reports experiencing tremors. Patient plans to return home at discharge and will follow up outpatient at the [facility]. Patient has not been agreeable to residential treatment due to needing to return to work at discharge. Patient has not been agreeable to family involvement in treatment.     PLAN:   Patient will continue stabilization. Patient will continue to receive services offered by Treatment Team.     Therapist recommends [facility] residential rehab. Patient plans to return home with  outpatient services.

## 2023-09-18 NOTE — PLAN OF CARE
Goal Outcome Evaluation:  Plan of Care Reviewed With: patient  Patient Agreement with Plan of Care: agrees     Progress: improving  Outcome Evaluation: Pt has been calm and cooperative during this shift. she rates anxiety and depression 5/10. She denies SI, HI, and AVH. Pt rates craving 6/10.

## 2023-09-18 NOTE — PROGRESS NOTES
"INPATIENT PSYCHIATRIC PROGRESS NOTE    Name:  Gee Morales  :  1980  MRN:  [id number]  Visit Number:  [id number]  Length of stay:  2    SUBJECTIVE    CC/Focus of Exam: alcohol use    INTERVAL HISTORY:  The patient reports she is feeling better and the withdrawals are being helped by the detox medications. She states she is interested in Vivitrol and agreed to start oral naltrexone.   Depression rating 5/10  Anxiety rating 4/10  Sleep:good  Withdrawal sx: tremors  Cravin/10    Review of Systems   Neurological:  Positive for tremors and headaches.     OBJECTIVE    Temp:  [97 °F (36.1 °C)-98.1 °F (36.7 °C)] 97 °F (36.1 °C)  Heart Rate:  [] 94  Resp:  [16-18] 18  BP: (110-156)/() 129/88    MENTAL STATUS EXAM:  Appearance:Casually dressed, good hygeine.   Cooperation:Cooperative  Psychomotor: No psychomotor agitation/retardation, No EPS, No motor tics  Speech-normal rate, amount.  Mood \"better\"   Affect-congruent, appropriate, stable  Thought Content-goal directed, no delusional material present  Thought process-linear, organized.  Suicidality: No SI  Homicidality: No HI  Perception: No AH/VH  Insight-fair   Judgement-fair    Lab Results (last 24 hours)       ** No results found for the last 24 hours. **               Imaging Results (Last 24 Hours)       ** No results found for the last 24 hours. **               ECG/EMG Results (most recent)       Procedure Component Value Units Date/Time    ECG 12 Lead Other; Baseline Cardiac Status [[id number]] Collected: 23 1725     Updated: 23 1827     QT Interval 400 ms      QTC Interval 478 ms     Narrative:      Test Reason : Other~  Blood Pressure :   */*   mmHG  Vent. Rate :  86 BPM     Atrial Rate :  86 BPM     P-R Int : 136 ms          QRS Dur :  96 ms      QT Int : 400 ms       P-R-T Axes :  34  -8  15 degrees     QTc Int : 478 ms    Normal sinus rhythm  Normal ECG  Confirmed by Ben Arana ([id number]) on 2023 6:27:15 PM    Referred " By: ALFREDO           Confirmed By: Ben Arana             ALLERGIES: Reglan [metoclopramide]    Medication Review:   Scheduled Medications:  B-complex with vitamin C, 2 tablet, Oral, Daily  cefdinir, 300 mg, Oral, Q12H  FLUoxetine, 10 mg, Oral, Daily  LORazepam, 1.5 mg, Oral, 3 times per day   Followed by  [START ON 2023] LORazepam, 1 mg, Oral, 3 times per day   Followed by  [START ON 2023] LORazepam, 0.5 mg, Oral, 3 times per day  multivitamin with minerals, 1 tablet, Oral, Daily  thiamine, 100 mg, Oral, Daily         PRN Medications:    aluminum-magnesium hydroxide-simethicone    benzonatate    benztropine **OR** benztropine    cloNIDine    famotidine    hydrOXYzine    ibuprofen    loperamide    [] LORazepam **FOLLOWED BY** LORazepam **FOLLOWED BY** [START ON 2023] LORazepam **FOLLOWED BY** [START ON 2023] LORazepam    magnesium hydroxide    ondansetron    sodium chloride    SUMAtriptan    traZODone   All medications reviewed.    ASSESSMENT & PLAN:      Alcohol use disorder, severe, dependence    Alcohol withdrawal  -Ativan detox  -Thiamine and folate  -Start naltrexone       Depressive disorder unspecified  -Started fluoxetine       Nicotine use disorder  -Encouraged cessation. Discussed risks of nicotine use. The patient agreed to do nicotine patches.        UTI  -Cefdinir       Hep C positive  -Patient reports she has been treated for it about 2 years ago. \      Migraine headache  -Sumatriptan 50 mg prn     Special precautions: Special Precautions Level 4 (q30 min checks).    Behavioral Health Treatment Plan and Problem List: I have reviewed and approved the Behavioral Health Treatment Plan and Problem list.  The patient has had a chance to review and agrees with the treatment plan.    Copied text in portions of this note has been reviewed and is accurate as of 23         Clinician:  Ashlie Love MD  23  11:43 EDT

## 2023-09-19 ENCOUNTER — DISEASE STATE MANAGEMENT VISIT (OUTPATIENT)
Dept: PHARMACY | Facility: HOSPITAL | Age: 43
End: 2023-09-19
Payer: MEDICAID

## 2023-09-19 VITALS
HEIGHT: 63 IN | RESPIRATION RATE: 16 BRPM | OXYGEN SATURATION: 96 % | WEIGHT: 158.4 LBS | SYSTOLIC BLOOD PRESSURE: 121 MMHG | DIASTOLIC BLOOD PRESSURE: 94 MMHG | BODY MASS INDEX: 28.07 KG/M2 | TEMPERATURE: 97.6 F | HEART RATE: 103 BPM

## 2023-09-19 DIAGNOSIS — F10.10 UNCOMPLICATED ALCOHOL ABUSE: Primary | ICD-10-CM

## 2023-09-19 PROCEDURE — 99239 HOSP IP/OBS DSCHRG MGMT >30: CPT | Performed by: PSYCHIATRY & NEUROLOGY

## 2023-09-19 RX ORDER — CEFDINIR 300 MG/1
300 CAPSULE ORAL EVERY 12 HOURS SCHEDULED
Qty: 7 CAPSULE | Refills: 0 | Status: SHIPPED | OUTPATIENT
Start: 2023-09-19 | End: 2023-09-23

## 2023-09-19 RX ORDER — FLUOXETINE 10 MG/1
10 CAPSULE ORAL DAILY
Qty: 30 CAPSULE | Refills: 0 | Status: SHIPPED | OUTPATIENT
Start: 2023-09-20

## 2023-09-19 RX ADMIN — CEFDINIR 300 MG: 300 CAPSULE ORAL at 08:19

## 2023-09-19 RX ADMIN — Medication 100 MG: at 08:05

## 2023-09-19 RX ADMIN — NALTREXONE HYDROCHLORIDE 50 MG: 50 TABLET, FILM COATED ORAL at 08:05

## 2023-09-19 RX ADMIN — FLUOXETINE HYDROCHLORIDE 10 MG: 10 CAPSULE ORAL at 08:19

## 2023-09-19 RX ADMIN — HYDROXYZINE HYDROCHLORIDE 50 MG: 50 TABLET ORAL at 08:05

## 2023-09-19 RX ADMIN — Medication 1 TABLET: at 08:05

## 2023-09-19 RX ADMIN — LORAZEPAM 1 MG: 1 TABLET ORAL at 08:05

## 2023-09-19 RX ADMIN — Medication 2 TABLET: at 08:04

## 2023-09-19 NOTE — PROGRESS NOTES
Pharmacy Note  Medication: naltrexone (Vivitrol)     Dose: 380mg IM every 4 weeks  Provider: Ashlie Love  Last Injection: n/a   PA status: not required    Past Medical History:   Diagnosis Date    Alcohol abuse     Delirium     Kidney stone     PTSD (post-traumatic stress disorder)     Urinary tract infection     Withdrawal symptoms, alcohol      Social History     Socioeconomic History    Marital status:    Tobacco Use    Smoking status: Never   Vaping Use    Vaping Use: Every day    Substances: Nicotine    Devices: Disposable   Substance and Sexual Activity    Alcohol use: Yes     Comment: see below    Drug use: Yes     Comment: etoh    Sexual activity: Yes     Partners: Male     Birth control/protection: Tubal ligation     Allergies   Allergen Reactions    Reglan [Metoclopramide] Rash     Current Outpatient Medications   Medication Sig Dispense Refill    cefdinir (OMNICEF) 300 MG capsule Take 1 capsule by mouth Every 12 (Twelve) Hours for 7 doses. Indications: Urinary Tract Infection 7 capsule 0    [START ON 9/20/2023] FLUoxetine (PROzac) 10 MG capsule Take 1 capsule by mouth Daily. Indications: Depression 30 capsule 0    Naltrexone (VIVITROL) 380 MG reconstituted suspension IM suspension Inject 380 mg into the appropriate muscle as directed by prescriber Every 30 (Thirty) Days. Indications: Abuse or Misuse of Alcohol 1 each 0     No current facility-administered medications for this visit.     Facility-Administered Medications Ordered in Other Visits   Medication Dose Route Frequency Provider Last Rate Last Admin    aluminum-magnesium hydroxide-simethicone (MAALOX MAX) 400-400-40 MG/5ML suspension 15 mL  15 mL Oral Q6H PRN Ashlie Love MD        B-complex with vitamin C tablet 2 tablet  2 tablet Oral Daily Ashlie Love MD   2 tablet at 09/19/23 0804    benzonatate (TESSALON) capsule 100 mg  100 mg Oral TID PRN Ashlie Love MD        benztropine (COGENTIN) tablet 2 mg  2 mg Oral Once PRN Kate  MD Ashlie        Or    benztropine (COGENTIN) injection 1 mg  1 mg Intramuscular Once PRN Ashlie Love MD        cefdinir (OMNICEF) capsule 300 mg  300 mg Oral Q12H Ashlie Love MD   300 mg at 09/19/23 0819    cloNIDine (CATAPRES) tablet 0.1 mg  0.1 mg Oral Q8H PRN Ashlie Love MD   0.1 mg at 09/17/23 0447    famotidine (PEPCID) tablet 20 mg  20 mg Oral BID PRN Ashlie Love MD        FLUoxetine (PROzac) capsule 10 mg  10 mg Oral Daily Ashlie Love MD   10 mg at 09/19/23 0819    hydrOXYzine (ATARAX) tablet 50 mg  50 mg Oral Q6H PRN Ashlie Love MD   50 mg at 09/19/23 0805    ibuprofen (ADVIL,MOTRIN) tablet 400 mg  400 mg Oral Q6H PRN Ashlie Love MD   400 mg at 09/17/23 0916    loperamide (IMODIUM) capsule 2 mg  2 mg Oral Q2H PRN Ashlie Love MD        LORazepam (ATIVAN) tablet 1 mg  1 mg Oral 3 times per day Ashlie Love MD   1 mg at 09/19/23 0805    Followed by    [START ON 9/20/2023] LORazepam (ATIVAN) tablet 0.5 mg  0.5 mg Oral 3 times per day Ashlie Love MD        LORazepam (ATIVAN) tablet 1 mg  1 mg Oral Q4H PRN Ashlie Love MD        Followed by    [START ON 9/20/2023] LORazepam (ATIVAN) tablet 0.5 mg  0.5 mg Oral Q4H PRN Ashlie Love MD        magnesium hydroxide (MILK OF MAGNESIA) suspension 10 mL  10 mL Oral Daily PRN Ashlie Love MD        multivitamin with minerals 1 tablet  1 tablet Oral Daily Ashlie Love MD   1 tablet at 09/19/23 0805    naltrexone (DEPADE) tablet 50 mg  50 mg Oral Daily Ashlie Love MD   50 mg at 09/19/23 0805    ondansetron (ZOFRAN) tablet 4 mg  4 mg Oral Q6H PRN Ashlie Love MD   4 mg at 09/16/23 2122    sodium chloride nasal spray 2 spray  2 spray Each Nare PRN Ashlie Love MD        SUMAtriptan (IMITREX) tablet 50 mg  50 mg Oral Q2H PRN Ashlie Love MD   50 mg at 09/17/23 1309    thiamine (VITAMIN B-1) tablet 100 mg  100 mg Oral Daily Ashlie Love MD   100 mg at 09/19/23 0805    traZODone (DESYREL) tablet 50 mg  50 mg Oral Nightly PRN Kate  MD Ashlie   50 mg at 09/18/23 2115       Labs  Lab Results   Component Value Date    GLUCOSE 97 09/15/2023    BUN 10 09/15/2023    CREATININE 0.88 09/15/2023    EGFR 84.8 09/15/2023    BCR 11.4 09/15/2023    K 4.7 09/15/2023    CO2 21.9 (L) 09/15/2023    CALCIUM 9.8 09/15/2023    ALBUMIN 4.2 09/15/2023    BILITOT 0.3 09/15/2023    AST 55 (H) 09/15/2023    ALT 32 09/15/2023     Last Urine Toxicity  More data exists         Latest Ref Rng & Units 9/16/2023 10/14/2020   LAST URINE TOXICITY RESULTS   Amphetamine, Urine Qual Negative Positive  Positive    Barbiturates Screen, Urine Negative Negative  Negative    Benzodiazepine Screen, Urine Negative Positive  Negative    Buprenorphine, Screen, Urine Negative Negative  Negative    Cocaine Screen, Urine Negative Negative  Negative    Fentanyl, Urine Negative Negative  -   Methadone Screen , Urine Negative Negative  Negative    Methamphetamine, Ur Negative Negative  -         Assessment  Gee Morales is a 43 y.o. female that was assessed today for long acting naltrexone therapy for the treatment of alcohol use disorder.    Patient has been taking oral naltrexone during hospital stay and did not experience any signs or symptoms of withdrawal or intolerance to medication. Patient is appropriate to begin Vivitrol injection today.     Patient was educated on the need to wear patient identification and let emergency contact know that they are on this medication. Patient is educated about opioid pain medications not working for her while on this medication and the need to let treatment team know about Vivitrol in the event of an emergency. Patient verbalized understanding. Patient is educated on the need to notify treatment team if he has a scheduled surgery/procedure that would require pain management so we can hold Vivitrol as necessary. Patient verbalized understanding and took patient identification bracelet, necklace, and wallet card with her after the appointment      Pt  denies pregnancy or breastfeeding and does not plan on becoming pregnant within the next month and denies feelings of depression or suicidal ideation.           Plan    Pt will receive Vivitrol 380mg IM into the upper outer quadrant of the RIGHT gluteal muscle today.  Pt to receive next injection in 28 days.     Pt education provided.  The patient denies questions/issues/concerns with the medication.     Follow-up in 4 weeks for next injection. Patient is scheduled with the [facility] on 10/17/2023 at 0900.    Ara Medley PharmD  09/19/23  13:02 EDT

## 2023-09-19 NOTE — DISCHARGE SUMMARY
":  1980  MRN:  [id number]  Visit Number:  [id number]      Date of Admission:2023   Date of Discharge:  2023    Discharge Diagnosis:  Principal Problem:    Alcohol use disorder, severe, dependence  Active Problems:    Alcohol withdrawal    Depressive disorder unspecified    Nicotine use disorder        Admission Diagnosis:  Alcohol dependence [F10.20]     HPI  Gee Morales is a 43 y.o. female who was admitted on 2023 with complaints of alcohol use and withdrawals.   For details please see H&P dated 23.    Hospital Course  Patient is a 43 y.o. female presented with alcohol use and withdrawals. The patient was admitted to the detox recovery unit and started on Ativan detox. The patient had an uneventful stay and didn't need extra doses of Ativan for emergent withdrawal symptoms. She was able to complete the detox a day early. She reported a history of depressive symptoms and was started on fluoxetine. Her UA was suggestive of a UTI and she was treated with cefdinir. She was interested in Vivitrol monthly injections to help with her alcohol use disorder. She was administered oral naltrexone and was able to take it without any complications.  Vivitrol injection was ordered at discharge and she received it no 23.     Mental Status Exam upon discharge:   Mood \"good\"   Affect-congruent, appropriate, stable  Thought Content-goal directed, no delusional material present  Thought process-linear, organized.  Suicidality: No SI  Homicidality: No HI  Perception: No AH/VH    Procedures Performed         Consults:   Consults       No orders found from 2023 to 2023.            Pertinent Test Results:   Admission on 2023   Component Date Value Ref Range Status    Hepatitis B Surface Ag 09/15/2023 Non-Reactive  Non-Reactive Final    Hep A IgM 09/15/2023 Non-Reactive  Non-Reactive Final    Hep B C IgM 09/15/2023 Non-Reactive  Non-Reactive Final    Hepatitis C Ab 09/15/2023 Reactive (A) "  Non-Reactive Final    QT Interval 09/16/2023 400  ms Final    QTC Interval 09/16/2023 478  ms Final   Admission on 09/15/2023, Discharged on 09/16/2023   Component Date Value Ref Range Status    Glucose 09/15/2023 97  65 - 99 mg/dL Final    BUN 09/15/2023 10  6 - 20 mg/dL Final    Creatinine 09/15/2023 0.88  0.57 - 1.00 mg/dL Final    Sodium 09/15/2023 137  136 - 145 mmol/L Final    Potassium 09/15/2023 4.7  3.5 - 5.2 mmol/L Final    1+ Hemolysis    Specimen hemolyzed.  Results may be affected.    Chloride 09/15/2023 105  98 - 107 mmol/L Final    CO2 09/15/2023 21.9 (L)  22.0 - 29.0 mmol/L Final    Calcium 09/15/2023 9.8  8.6 - 10.5 mg/dL Final    Total Protein 09/15/2023 8.0  6.0 - 8.5 g/dL Final    Albumin 09/15/2023 4.2  3.5 - 5.2 g/dL Final    ALT (SGPT) 09/15/2023 32  1 - 33 U/L Final    Specimen hemolyzed.  Results may be affected.    AST (SGOT) 09/15/2023 55 (H)  1 - 32 U/L Final    Alkaline Phosphatase 09/15/2023 87  39 - 117 U/L Final    Total Bilirubin 09/15/2023 0.3  0.0 - 1.2 mg/dL Final    Globulin 09/15/2023 3.8  gm/dL Final    A/G Ratio 09/15/2023 1.1  g/dL Final    BUN/Creatinine Ratio 09/15/2023 11.4  7.0 - 25.0 Final    Anion Gap 09/15/2023 10.1  5.0 - 15.0 mmol/L Final    eGFR 09/15/2023 84.8  >60.0 mL/min/1.73 Final    Acetaminophen 09/15/2023 <5.0  0.0 - 30.0 mcg/mL Final    Ethanol 09/15/2023 <10  0 - 10 mg/dL Final    Ethanol % 09/15/2023 <0.010  % Final    Salicylate 09/15/2023 <0.3  <=30.0 mg/dL Final    THC, Screen, Urine 09/16/2023 Negative  Negative Final    Phencyclidine (PCP), Urine 09/16/2023 Negative  Negative Final    Cocaine Screen, Urine 09/16/2023 Negative  Negative Final    Methamphetamine, Ur 09/16/2023 Negative  Negative Final    Opiate Screen 09/16/2023 Negative  Negative Final    Amphetamine Screen, Urine 09/16/2023 Positive (A)  Negative Final    Benzodiazepine Screen, Urine 09/16/2023 Positive (A)  Negative Final    Tricyclic Antidepressants Screen 09/16/2023 Negative   Negative Final    Methadone Screen, Urine 09/16/2023 Negative  Negative Final    Barbiturates Screen, Urine 09/16/2023 Negative  Negative Final    Oxycodone Screen, Urine 09/16/2023 Negative  Negative Final    Propoxyphene Screen 09/16/2023 Negative  Negative Final    Buprenorphine, Screen, Urine 09/16/2023 Negative  Negative Final    HCG Qualitative 09/15/2023 Negative  Negative Final    TSH 09/15/2023 1.480  0.270 - 4.200 uIU/mL Final    Protime 09/15/2023 11.9 (L)  12.1 - 14.7 Seconds Final    INR 09/15/2023 0.83 (L)  0.90 - 1.10 Final    Extra Tube 09/15/2023 Hold for add-ons.   Final    Auto resulted.    Extra Tube 09/15/2023 hold for add-on   Final    Auto resulted    Extra Tube 09/15/2023 Hold for add-ons.   Final    Auto resulted    WBC 09/15/2023 6.79  3.40 - 10.80 10*3/mm3 Final    RBC 09/15/2023 4.54  3.77 - 5.28 10*6/mm3 Final    Hemoglobin 09/15/2023 12.8  12.0 - 15.9 g/dL Final    Hematocrit 09/15/2023 39.4  34.0 - 46.6 % Final    MCV 09/15/2023 86.8  79.0 - 97.0 fL Final    MCH 09/15/2023 28.2  26.6 - 33.0 pg Final    MCHC 09/15/2023 32.5  31.5 - 35.7 g/dL Final    RDW 09/15/2023 14.4  12.3 - 15.4 % Final    RDW-SD 09/15/2023 45.0  37.0 - 54.0 fl Final    MPV 09/15/2023 8.8  6.0 - 12.0 fL Final    Platelets 09/15/2023 347  140 - 450 10*3/mm3 Final    Neutrophil % 09/15/2023 70.9  42.7 - 76.0 % Final    Lymphocyte % 09/15/2023 17.2 (L)  19.6 - 45.3 % Final    Monocyte % 09/15/2023 7.5  5.0 - 12.0 % Final    Eosinophil % 09/15/2023 3.5  0.3 - 6.2 % Final    Basophil % 09/15/2023 0.6  0.0 - 1.5 % Final    Immature Grans % 09/15/2023 0.3  0.0 - 0.5 % Final    Neutrophils, Absolute 09/15/2023 4.81  1.70 - 7.00 10*3/mm3 Final    Lymphocytes, Absolute 09/15/2023 1.17  0.70 - 3.10 10*3/mm3 Final    Monocytes, Absolute 09/15/2023 0.51  0.10 - 0.90 10*3/mm3 Final    Eosinophils, Absolute 09/15/2023 0.24  0.00 - 0.40 10*3/mm3 Final    Basophils, Absolute 09/15/2023 0.04  0.00 - 0.20 10*3/mm3 Final    Immature  Grans, Absolute 09/15/2023 0.02  0.00 - 0.05 10*3/mm3 Final    nRBC 09/15/2023 0.0  0.0 - 0.2 /100 WBC Final    Glucose 09/15/2023 106  70 - 130 mg/dL Final    Magnesium 09/15/2023 2.2  1.6 - 2.6 mg/dL Final    Color, UA 09/16/2023 Yellow  Yellow, Straw Final    Appearance, UA 09/16/2023 Clear  Clear Final    pH, UA 09/16/2023 6.5  5.0 - 8.0 Final    Specific Gravity, UA 09/16/2023 1.014  1.005 - 1.030 Final    Glucose, UA 09/16/2023 Negative  Negative Final    Ketones, UA 09/16/2023 Negative  Negative Final    Bilirubin, UA 09/16/2023 Negative  Negative Final    Blood, UA 09/16/2023 Negative  Negative Final    Protein, UA 09/16/2023 Negative  Negative Final    Leuk Esterase, UA 09/16/2023 Small (1+) (A)  Negative Final    Nitrite, UA 09/16/2023 Negative  Negative Final    Urobilinogen, UA 09/16/2023 0.2 E.U./dL  0.2 - 1.0 E.U./dL Final    Fentanyl, Urine 09/16/2023 Negative  Negative Final    RBC, UA 09/16/2023 0-2  None Seen, 0-2 /HPF Final    WBC, UA 09/16/2023 Too Numerous to Count (A)  None Seen, 0-2 /HPF Final    Bacteria, UA 09/16/2023 1+ (A)  None Seen /HPF Final    Squamous Epithelial Cells, UA 09/16/2023 3-6 (A)  None Seen, 0-2 /HPF Final    Hyaline Casts, UA 09/16/2023 None Seen  None Seen /LPF Final    Methodology 09/16/2023 Automated Microscopy   Final    Urine Culture 09/16/2023 50,000 CFU/mL Streptococcus agalactiae (Group B) (A)   Final      This organism is considered to be universally susceptible to penicillin.  No further antibiotic testing will be performed.   Admission on 09/10/2023, Discharged on 09/10/2023   Component Date Value Ref Range Status    Glucose 09/10/2023 113 (H)  65 - 99 mg/dL Final    BUN 09/10/2023 11  6 - 20 mg/dL Final    Creatinine 09/10/2023 0.79  0.57 - 1.00 mg/dL Final    Sodium 09/10/2023 140  136 - 145 mmol/L Final    Potassium 09/10/2023 4.2  3.5 - 5.2 mmol/L Final    Slight hemolysis detected by analyzer. Results may be affected.    Chloride 09/10/2023 102  98 - 107  mmol/L Final    CO2 09/10/2023 26.0  22.0 - 29.0 mmol/L Final    Calcium 09/10/2023 9.6  8.6 - 10.5 mg/dL Final    Total Protein 09/10/2023 7.7  6.0 - 8.5 g/dL Final    Albumin 09/10/2023 4.3  3.5 - 5.2 g/dL Final    ALT (SGPT) 09/10/2023 30  1 - 33 U/L Final    AST (SGOT) 09/10/2023 49 (H)  1 - 32 U/L Final    Alkaline Phosphatase 09/10/2023 88  39 - 117 U/L Final    Total Bilirubin 09/10/2023 0.4  0.0 - 1.2 mg/dL Final    Globulin 09/10/2023 3.4  gm/dL Final    Calculated Result    A/G Ratio 09/10/2023 1.3  g/dL Final    BUN/Creatinine Ratio 09/10/2023 13.9  7.0 - 25.0 Final    Anion Gap 09/10/2023 12.0  5.0 - 15.0 mmol/L Final    eGFR 09/10/2023 96.5  >60.0 mL/min/1.73 Final    Ethanol 09/10/2023 <10  0 - 10 mg/dL Final    Extra Tube 09/10/2023 Hold for add-ons.   Final    Auto resulted.    Extra Tube 09/10/2023 hold for add-on   Final    Auto resulted    Extra Tube 09/10/2023 Hold for add-ons.   Final    Auto resulted.    Extra Tube 09/10/2023 Hold for add-ons.   Final    Auto resulted.    Extra Tube 09/10/2023 Hold for add-ons.   Final    Auto resulted    WBC 09/10/2023 6.91  3.40 - 10.80 10*3/mm3 Final    RBC 09/10/2023 4.56  3.77 - 5.28 10*6/mm3 Final    Hemoglobin 09/10/2023 12.9  12.0 - 15.9 g/dL Final    Hematocrit 09/10/2023 40.5  34.0 - 46.6 % Final    MCV 09/10/2023 88.8  79.0 - 97.0 fL Final    MCH 09/10/2023 28.3  26.6 - 33.0 pg Final    MCHC 09/10/2023 31.9  31.5 - 35.7 g/dL Final    RDW 09/10/2023 14.0  12.3 - 15.4 % Final    RDW-SD 09/10/2023 44.5  37.0 - 54.0 fl Final    MPV 09/10/2023 9.1  6.0 - 12.0 fL Final    Platelets 09/10/2023 352  140 - 450 10*3/mm3 Final    Neutrophil % 09/10/2023 66.0  42.7 - 76.0 % Final    Lymphocyte % 09/10/2023 23.2  19.6 - 45.3 % Final    Monocyte % 09/10/2023 6.1  5.0 - 12.0 % Final    Eosinophil % 09/10/2023 3.6  0.3 - 6.2 % Final    Basophil % 09/10/2023 0.7  0.0 - 1.5 % Final    Immature Grans % 09/10/2023 0.4  0.0 - 0.5 % Final    Neutrophils, Absolute  09/10/2023 4.56  1.70 - 7.00 10*3/mm3 Final    Lymphocytes, Absolute 09/10/2023 1.60  0.70 - 3.10 10*3/mm3 Final    Monocytes, Absolute 09/10/2023 0.42  0.10 - 0.90 10*3/mm3 Final    Eosinophils, Absolute 09/10/2023 0.25  0.00 - 0.40 10*3/mm3 Final    Basophils, Absolute 09/10/2023 0.05  0.00 - 0.20 10*3/mm3 Final    Immature Grans, Absolute 09/10/2023 0.03  0.00 - 0.05 10*3/mm3 Final    nRBC 09/10/2023 0.0  0.0 - 0.2 /100 WBC Final        Condition on Discharge:  improved    Vital Signs  Temp:  [97.3 °F (36.3 °C)-97.7 °F (36.5 °C)] 97.6 °F (36.4 °C)  Heart Rate:  [] 103  Resp:  [16-18] 16  BP: (104-141)/(67-97) 121/94      Discharge Disposition:  Home or Self Care    Discharge Medications:     Discharge Medications        New Medications        Instructions Start Date   cefdinir 300 MG capsule  Commonly known as: OMNICEF   300 mg, Oral, Every 12 Hours Scheduled      FLUoxetine 10 MG capsule  Commonly known as: PROzac   10 mg, Oral, Daily   Start Date: September 20, 2023     Naltrexone 380 MG reconstituted suspension IM suspension  Commonly known as: VIVITROL   380 mg, Intramuscular, Every 30 Days             Stop These Medications      chlordiazePOXIDE 25 MG capsule  Commonly known as: LIBRIUM     ondansetron ODT 4 MG disintegrating tablet  Commonly known as: ZOFRAN-ODT              Discharge Diet: Regular     Activity at Discharge: As tolerated     Follow-up Appointments  Future Appointments   Date Time Provider Department Center   9/28/2023 11:00 AM [facility]         Test Results Pending at Discharge      Time spent in discharge: > 30 min    Clinician:   Ashlie Love MD  09/19/23  10:16 EDT

## 2023-09-19 NOTE — PLAN OF CARE
Goal Outcome Evaluation:  Plan of Care Reviewed With: patient  Patient Agreement with Plan of Care: agrees     Progress: improving  Outcome Evaluation: Patient cooperative during assessment and has engaged appropriately with peers and staff. Reports anxiety of 4 and depression of 3, denied withdrawal symptoms or craving. Denied SI/HI/AVH.

## 2023-09-19 NOTE — CASE MANAGEMENT/SOCIAL WORK
Case Management/Social Work    Patient Name:  Gee Morales  YOB: 1980  MRN: [id number]  Admit Date:  9/16/2023    Patient is being discharged home on this date, will provide own transportation. Patient reports improvement in mood and withdrawal symptoms, denies current SI/HI/AVH. Patient is scheduled at the [facility] for aftercare. Therapist has discussed healthy coping skills and relapse prevention with patient. Therapist has assisted patient in identifying risk factors which would indicate the need for higher level of care including thoughts to harm self or others and/or self-harming behavior. Encouraged patient to call [phone] or present to the nearest emergency room should any of these events occur.     Electronically signed by:  JUAN MANUEL Herrera  09/19/23 10:16 EDT

## 2023-09-28 ENCOUNTER — OFFICE VISIT (OUTPATIENT)
Dept: PSYCHIATRY | Facility: CLINIC | Age: 43
End: 2023-09-28
Payer: MEDICAID

## 2023-09-28 DIAGNOSIS — F33.1 MAJOR DEPRESSIVE DISORDER, RECURRENT EPISODE, MODERATE: ICD-10-CM

## 2023-09-28 DIAGNOSIS — F41.1 GENERALIZED ANXIETY DISORDER: Primary | ICD-10-CM

## 2023-09-28 DIAGNOSIS — F43.10 POST TRAUMATIC STRESS DISORDER (PTSD): ICD-10-CM

## 2023-09-28 DIAGNOSIS — F10.21 ALCOHOLISM IN RECOVERY: ICD-10-CM

## 2023-09-28 PROCEDURE — 90791 PSYCH DIAGNOSTIC EVALUATION: CPT | Performed by: COUNSELOR
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
## 2023-12-18 ENCOUNTER — HOSPITAL ENCOUNTER (EMERGENCY)
Facility: HOSPITAL | Age: 43
Discharge: PSYCHIATRIC OR SUBSTANCE USE FACILITY | End: 2023-12-19
Attending: STUDENT IN AN ORGANIZED HEALTH CARE EDUCATION/TRAINING PROGRAM | Admitting: STUDENT IN AN ORGANIZED HEALTH CARE EDUCATION/TRAINING PROGRAM
Payer: MEDICAID

## 2023-12-18 DIAGNOSIS — F10.10 ALCOHOL ABUSE: Primary | ICD-10-CM

## 2023-12-18 DIAGNOSIS — N39.0 ACUTE UTI: ICD-10-CM

## 2023-12-18 LAB
ALBUMIN SERPL-MCNC: 4.1 G/DL (ref 3.5–5.2)
ALBUMIN/GLOB SERPL: 1.2 G/DL
ALP SERPL-CCNC: 97 U/L (ref 39–117)
ALT SERPL W P-5'-P-CCNC: 26 U/L (ref 1–33)
AMPHET+METHAMPHET UR QL: POSITIVE
AMPHETAMINES UR QL: POSITIVE
ANION GAP SERPL CALCULATED.3IONS-SCNC: 14.9 MMOL/L (ref 5–15)
AST SERPL-CCNC: 39 U/L (ref 1–32)
B-HCG UR QL: NEGATIVE
BACTERIA UR QL AUTO: ABNORMAL /HPF
BARBITURATES UR QL SCN: NEGATIVE
BASOPHILS # BLD AUTO: 0.05 10*3/MM3 (ref 0–0.2)
BASOPHILS NFR BLD AUTO: 0.4 % (ref 0–1.5)
BENZODIAZ UR QL SCN: NEGATIVE
BILIRUB SERPL-MCNC: 0.4 MG/DL (ref 0–1.2)
BILIRUB UR QL STRIP: ABNORMAL
BUN SERPL-MCNC: 7 MG/DL (ref 6–20)
BUN/CREAT SERPL: 9.2 (ref 7–25)
BUPRENORPHINE SERPL-MCNC: NEGATIVE NG/ML
CALCIUM SPEC-SCNC: 8.5 MG/DL (ref 8.6–10.5)
CANNABINOIDS SERPL QL: NEGATIVE
CHLORIDE SERPL-SCNC: 99 MMOL/L (ref 98–107)
CLARITY UR: ABNORMAL
CO2 SERPL-SCNC: 23.1 MMOL/L (ref 22–29)
COCAINE UR QL: NEGATIVE
COLOR UR: ABNORMAL
CREAT SERPL-MCNC: 0.76 MG/DL (ref 0.57–1)
CRP SERPL-MCNC: 0.8 MG/DL (ref 0–0.5)
D-LACTATE SERPL-SCNC: 2.8 MMOL/L (ref 0.5–2)
D-LACTATE SERPL-SCNC: 2.9 MMOL/L (ref 0.5–2)
DEPRECATED RDW RBC AUTO: 44.1 FL (ref 37–54)
EGFRCR SERPLBLD CKD-EPI 2021: 101.1 ML/MIN/1.73
EOSINOPHIL # BLD AUTO: 0.07 10*3/MM3 (ref 0–0.4)
EOSINOPHIL NFR BLD AUTO: 0.6 % (ref 0.3–6.2)
ERYTHROCYTE [DISTWIDTH] IN BLOOD BY AUTOMATED COUNT: 13.9 % (ref 12.3–15.4)
ERYTHROCYTE [SEDIMENTATION RATE] IN BLOOD: 24 MM/HR (ref 0–20)
ETHANOL BLD-MCNC: 126 MG/DL (ref 0–10)
ETHANOL BLD-MCNC: 168 MG/DL (ref 0–10)
ETHANOL BLD-MCNC: 83 MG/DL (ref 0–10)
ETHANOL UR QL: 0.08 %
ETHANOL UR QL: 0.13 %
ETHANOL UR QL: 0.17 %
FENTANYL UR-MCNC: NEGATIVE NG/ML
GLOBULIN UR ELPH-MCNC: 3.4 GM/DL
GLUCOSE SERPL-MCNC: 171 MG/DL (ref 65–99)
GLUCOSE UR STRIP-MCNC: NEGATIVE MG/DL
HCT VFR BLD AUTO: 37.1 % (ref 34–46.6)
HGB BLD-MCNC: 12.2 G/DL (ref 12–15.9)
HGB UR QL STRIP.AUTO: ABNORMAL
HOLD SPECIMEN: NORMAL
HYALINE CASTS UR QL AUTO: ABNORMAL /LPF
IMM GRANULOCYTES # BLD AUTO: 0.04 10*3/MM3 (ref 0–0.05)
IMM GRANULOCYTES NFR BLD AUTO: 0.4 % (ref 0–0.5)
KETONES UR QL STRIP: NEGATIVE
LEUKOCYTE ESTERASE UR QL STRIP.AUTO: ABNORMAL
LYMPHOCYTES # BLD AUTO: 1.89 10*3/MM3 (ref 0.7–3.1)
LYMPHOCYTES NFR BLD AUTO: 16.8 % (ref 19.6–45.3)
MAGNESIUM SERPL-MCNC: 1.7 MG/DL (ref 1.6–2.6)
MCH RBC QN AUTO: 29.2 PG (ref 26.6–33)
MCHC RBC AUTO-ENTMCNC: 32.9 G/DL (ref 31.5–35.7)
MCV RBC AUTO: 88.8 FL (ref 79–97)
METHADONE UR QL SCN: NEGATIVE
MONOCYTES # BLD AUTO: 0.64 10*3/MM3 (ref 0.1–0.9)
MONOCYTES NFR BLD AUTO: 5.7 % (ref 5–12)
NEUTROPHILS NFR BLD AUTO: 76.1 % (ref 42.7–76)
NEUTROPHILS NFR BLD AUTO: 8.59 10*3/MM3 (ref 1.7–7)
NITRITE UR QL STRIP: POSITIVE
NRBC BLD AUTO-RTO: 0 /100 WBC (ref 0–0.2)
OPIATES UR QL: NEGATIVE
OXYCODONE UR QL SCN: NEGATIVE
PCP UR QL SCN: NEGATIVE
PH UR STRIP.AUTO: 6 [PH] (ref 5–8)
PLATELET # BLD AUTO: 249 10*3/MM3 (ref 140–450)
PMV BLD AUTO: 9 FL (ref 6–12)
POTASSIUM SERPL-SCNC: 2.9 MMOL/L (ref 3.5–5.2)
PROCALCITONIN SERPL-MCNC: 0.03 NG/ML (ref 0–0.25)
PROT SERPL-MCNC: 7.5 G/DL (ref 6–8.5)
PROT UR QL STRIP: ABNORMAL
RBC # BLD AUTO: 4.18 10*6/MM3 (ref 3.77–5.28)
RBC # UR STRIP: ABNORMAL /HPF
REF LAB TEST METHOD: ABNORMAL
SODIUM SERPL-SCNC: 137 MMOL/L (ref 136–145)
SP GR UR STRIP: 1.01 (ref 1–1.03)
SQUAMOUS #/AREA URNS HPF: ABNORMAL /HPF
TRICYCLICS UR QL SCN: NEGATIVE
TSH SERPL DL<=0.05 MIU/L-ACNC: 1.64 UIU/ML (ref 0.27–4.2)
UROBILINOGEN UR QL STRIP: ABNORMAL
WBC # UR STRIP: ABNORMAL /HPF
WBC NRBC COR # BLD AUTO: 11.28 10*3/MM3 (ref 3.4–10.8)
WHOLE BLOOD HOLD COAG: NORMAL
WHOLE BLOOD HOLD SPECIMEN: NORMAL

## 2023-12-18 PROCEDURE — 99285 EMERGENCY DEPT VISIT HI MDM: CPT

## 2023-12-18 PROCEDURE — 87186 SC STD MICRODIL/AGAR DIL: CPT | Performed by: PHYSICIAN ASSISTANT

## 2023-12-18 PROCEDURE — 85025 COMPLETE CBC W/AUTO DIFF WBC: CPT | Performed by: PHYSICIAN ASSISTANT

## 2023-12-18 PROCEDURE — 93010 ELECTROCARDIOGRAM REPORT: CPT | Performed by: INTERNAL MEDICINE

## 2023-12-18 PROCEDURE — 82077 ASSAY SPEC XCP UR&BREATH IA: CPT | Performed by: PHYSICIAN ASSISTANT

## 2023-12-18 PROCEDURE — 84443 ASSAY THYROID STIM HORMONE: CPT | Performed by: PHYSICIAN ASSISTANT

## 2023-12-18 PROCEDURE — 96368 THER/DIAG CONCURRENT INF: CPT

## 2023-12-18 PROCEDURE — 25810000003 SODIUM CHLORIDE 0.9 % SOLUTION: Performed by: PHYSICIAN ASSISTANT

## 2023-12-18 PROCEDURE — 81001 URINALYSIS AUTO W/SCOPE: CPT | Performed by: PHYSICIAN ASSISTANT

## 2023-12-18 PROCEDURE — 82077 ASSAY SPEC XCP UR&BREATH IA: CPT | Performed by: STUDENT IN AN ORGANIZED HEALTH CARE EDUCATION/TRAINING PROGRAM

## 2023-12-18 PROCEDURE — 86140 C-REACTIVE PROTEIN: CPT | Performed by: PHYSICIAN ASSISTANT

## 2023-12-18 PROCEDURE — 93005 ELECTROCARDIOGRAM TRACING: CPT | Performed by: PHYSICIAN ASSISTANT

## 2023-12-18 PROCEDURE — 25010000002 LORAZEPAM PER 2 MG: Performed by: STUDENT IN AN ORGANIZED HEALTH CARE EDUCATION/TRAINING PROGRAM

## 2023-12-18 PROCEDURE — 83735 ASSAY OF MAGNESIUM: CPT | Performed by: PHYSICIAN ASSISTANT

## 2023-12-18 PROCEDURE — 96375 TX/PRO/DX INJ NEW DRUG ADDON: CPT

## 2023-12-18 PROCEDURE — 96365 THER/PROPH/DIAG IV INF INIT: CPT

## 2023-12-18 PROCEDURE — 87040 BLOOD CULTURE FOR BACTERIA: CPT | Performed by: PHYSICIAN ASSISTANT

## 2023-12-18 PROCEDURE — 25010000002 THIAMINE PER 100 MG: Performed by: PHYSICIAN ASSISTANT

## 2023-12-18 PROCEDURE — 36415 COLL VENOUS BLD VENIPUNCTURE: CPT

## 2023-12-18 PROCEDURE — 87086 URINE CULTURE/COLONY COUNT: CPT | Performed by: PHYSICIAN ASSISTANT

## 2023-12-18 PROCEDURE — 84145 PROCALCITONIN (PCT): CPT | Performed by: PHYSICIAN ASSISTANT

## 2023-12-18 PROCEDURE — 25010000002 CEFTRIAXONE PER 250 MG: Performed by: PHYSICIAN ASSISTANT

## 2023-12-18 PROCEDURE — 80053 COMPREHEN METABOLIC PANEL: CPT | Performed by: PHYSICIAN ASSISTANT

## 2023-12-18 PROCEDURE — 85652 RBC SED RATE AUTOMATED: CPT | Performed by: PHYSICIAN ASSISTANT

## 2023-12-18 PROCEDURE — 87088 URINE BACTERIA CULTURE: CPT | Performed by: PHYSICIAN ASSISTANT

## 2023-12-18 PROCEDURE — 80307 DRUG TEST PRSMV CHEM ANLYZR: CPT | Performed by: PHYSICIAN ASSISTANT

## 2023-12-18 PROCEDURE — 83605 ASSAY OF LACTIC ACID: CPT | Performed by: PHYSICIAN ASSISTANT

## 2023-12-18 PROCEDURE — 81025 URINE PREGNANCY TEST: CPT | Performed by: PHYSICIAN ASSISTANT

## 2023-12-18 RX ORDER — LORAZEPAM 2 MG/1
2 TABLET ORAL EVERY 6 HOURS
Status: DISCONTINUED | OUTPATIENT
Start: 2023-12-19 | End: 2023-12-19 | Stop reason: HOSPADM

## 2023-12-18 RX ORDER — LORAZEPAM 2 MG/ML
1 INJECTION INTRAMUSCULAR
Status: DISCONTINUED | OUTPATIENT
Start: 2023-12-18 | End: 2023-12-19 | Stop reason: HOSPADM

## 2023-12-18 RX ORDER — LORAZEPAM 1 MG/1
1 TABLET ORAL
Status: DISCONTINUED | OUTPATIENT
Start: 2023-12-18 | End: 2023-12-19 | Stop reason: HOSPADM

## 2023-12-18 RX ORDER — LORAZEPAM 2 MG/1
2 TABLET ORAL
Status: DISCONTINUED | OUTPATIENT
Start: 2023-12-18 | End: 2023-12-19 | Stop reason: HOSPADM

## 2023-12-18 RX ORDER — THIAMINE HYDROCHLORIDE 100 MG/ML
200 INJECTION, SOLUTION INTRAMUSCULAR; INTRAVENOUS ONCE
Status: COMPLETED | OUTPATIENT
Start: 2023-12-18 | End: 2023-12-18

## 2023-12-18 RX ORDER — LORAZEPAM 2 MG/ML
1 INJECTION INTRAMUSCULAR ONCE
Status: COMPLETED | OUTPATIENT
Start: 2023-12-18 | End: 2023-12-18

## 2023-12-18 RX ORDER — LORAZEPAM 2 MG/ML
2 INJECTION INTRAMUSCULAR
Status: DISCONTINUED | OUTPATIENT
Start: 2023-12-18 | End: 2023-12-19 | Stop reason: HOSPADM

## 2023-12-18 RX ORDER — LORAZEPAM 1 MG/1
1 TABLET ORAL EVERY 6 HOURS
Status: DISCONTINUED | OUTPATIENT
Start: 2023-12-20 | End: 2023-12-19 | Stop reason: HOSPADM

## 2023-12-18 RX ORDER — LORAZEPAM 2 MG/1
2 TABLET ORAL ONCE
Status: COMPLETED | OUTPATIENT
Start: 2023-12-18 | End: 2023-12-18

## 2023-12-18 RX ORDER — SODIUM CHLORIDE 9 MG/ML
1000 INJECTION, SOLUTION INTRAVENOUS ONCE
Status: COMPLETED | OUTPATIENT
Start: 2023-12-18 | End: 2023-12-18

## 2023-12-18 RX ORDER — POTASSIUM CHLORIDE 20 MEQ/1
40 TABLET, EXTENDED RELEASE ORAL ONCE
Status: COMPLETED | OUTPATIENT
Start: 2023-12-18 | End: 2023-12-18

## 2023-12-18 RX ORDER — SODIUM CHLORIDE 0.9 % (FLUSH) 0.9 %
10 SYRINGE (ML) INJECTION AS NEEDED
Status: DISCONTINUED | OUTPATIENT
Start: 2023-12-18 | End: 2023-12-19 | Stop reason: HOSPADM

## 2023-12-18 RX ORDER — LORAZEPAM 2 MG/ML
0.5 INJECTION INTRAMUSCULAR ONCE
Status: COMPLETED | OUTPATIENT
Start: 2023-12-18 | End: 2023-12-19

## 2023-12-18 RX ADMIN — FOLIC ACID 1 MG: 5 INJECTION, SOLUTION INTRAMUSCULAR; INTRAVENOUS; SUBCUTANEOUS at 19:18

## 2023-12-18 RX ADMIN — LORAZEPAM 1 MG: 2 INJECTION INTRAMUSCULAR; INTRAVENOUS at 23:06

## 2023-12-18 RX ADMIN — SODIUM CHLORIDE 1000 ML: 9 INJECTION, SOLUTION INTRAVENOUS at 19:18

## 2023-12-18 RX ADMIN — SODIUM CHLORIDE 1000 ML: 9 INJECTION, SOLUTION INTRAVENOUS at 19:48

## 2023-12-18 RX ADMIN — LORAZEPAM 2 MG: 2 TABLET ORAL at 18:57

## 2023-12-18 RX ADMIN — THIAMINE HYDROCHLORIDE 200 MG: 100 INJECTION, SOLUTION INTRAMUSCULAR; INTRAVENOUS at 19:00

## 2023-12-18 RX ADMIN — SODIUM CHLORIDE 2000 MG: 9 INJECTION, SOLUTION INTRAVENOUS at 19:19

## 2023-12-18 RX ADMIN — POTASSIUM CHLORIDE 40 MEQ: 1500 TABLET, EXTENDED RELEASE ORAL at 18:57

## 2023-12-18 NOTE — ED NOTES
MEDICAL SCREENING:    Reason for Visit: alcohol detox, drinks 1.5 pint daily. States has had a 1/2 pint today     Patient initially seen in triage.  The patient was advised further evaluation and diagnostic testing will be needed, some of the treatment and testing will be initiated in the lobby in order to begin the process.  The patient will be returned to the waiting area for the time being and possibly be re-assessed by a subsequent ED provider.  The patient will be brought back to the treatment area in as timely manner as possible.       Mitali Mitchell PA  12/18/23 1739

## 2023-12-19 ENCOUNTER — HOSPITAL ENCOUNTER (INPATIENT)
Facility: HOSPITAL | Age: 43
LOS: 3 days | Discharge: HOME | End: 2023-12-22
Attending: PSYCHIATRY & NEUROLOGY | Admitting: PSYCHIATRY & NEUROLOGY
Payer: MEDICAID

## 2023-12-19 VITALS
BODY MASS INDEX: 28 KG/M2 | DIASTOLIC BLOOD PRESSURE: 100 MMHG | HEART RATE: 118 BPM | WEIGHT: 158 LBS | SYSTOLIC BLOOD PRESSURE: 149 MMHG | TEMPERATURE: 98.2 F | OXYGEN SATURATION: 96 % | HEIGHT: 63 IN | RESPIRATION RATE: 18 BRPM

## 2023-12-19 PROBLEM — F11.21 OPIOID USE DISORDER, MODERATE, IN SUSTAINED REMISSION: Status: ACTIVE | Noted: 2023-12-19

## 2023-12-19 PROBLEM — F10.10 ALCOHOL ABUSE: Status: ACTIVE | Noted: 2023-12-19

## 2023-12-19 PROBLEM — F15.10 METHAMPHETAMINE USE DISORDER, MILD: Status: ACTIVE | Noted: 2023-12-19

## 2023-12-19 LAB
ALBUMIN SERPL-MCNC: 3.5 G/DL (ref 3.5–5.2)
ALBUMIN/GLOB SERPL: 1.3 G/DL
ALP SERPL-CCNC: 87 U/L (ref 39–117)
ALT SERPL W P-5'-P-CCNC: 18 U/L (ref 1–33)
ANION GAP SERPL CALCULATED.3IONS-SCNC: 10.7 MMOL/L (ref 5–15)
AST SERPL-CCNC: 29 U/L (ref 1–32)
BASOPHILS # BLD AUTO: 0.04 10*3/MM3 (ref 0–0.2)
BASOPHILS NFR BLD AUTO: 0.4 % (ref 0–1.5)
BILIRUB SERPL-MCNC: 0.5 MG/DL (ref 0–1.2)
BUN SERPL-MCNC: 8 MG/DL (ref 6–20)
BUN/CREAT SERPL: 10.7 (ref 7–25)
CALCIUM SPEC-SCNC: 7.3 MG/DL (ref 8.6–10.5)
CHLORIDE SERPL-SCNC: 104 MMOL/L (ref 98–107)
CO2 SERPL-SCNC: 25.3 MMOL/L (ref 22–29)
CREAT SERPL-MCNC: 0.75 MG/DL (ref 0.57–1)
D-LACTATE SERPL-SCNC: 1.3 MMOL/L (ref 0.5–2)
DEPRECATED RDW RBC AUTO: 44.3 FL (ref 37–54)
EGFRCR SERPLBLD CKD-EPI 2021: 102.7 ML/MIN/1.73
EOSINOPHIL # BLD AUTO: 0.18 10*3/MM3 (ref 0–0.4)
EOSINOPHIL NFR BLD AUTO: 1.9 % (ref 0.3–6.2)
ERYTHROCYTE [DISTWIDTH] IN BLOOD BY AUTOMATED COUNT: 14.1 % (ref 12.3–15.4)
GLOBULIN UR ELPH-MCNC: 2.8 GM/DL
GLUCOSE SERPL-MCNC: 120 MG/DL (ref 65–99)
HAV IGM SERPL QL IA: ABNORMAL
HBV CORE IGM SERPL QL IA: ABNORMAL
HBV SURFACE AG SERPL QL IA: ABNORMAL
HCT VFR BLD AUTO: 33.3 % (ref 34–46.6)
HCV AB SER DONR QL: REACTIVE
HGB BLD-MCNC: 11 G/DL (ref 12–15.9)
IMM GRANULOCYTES # BLD AUTO: 0.02 10*3/MM3 (ref 0–0.05)
IMM GRANULOCYTES NFR BLD AUTO: 0.2 % (ref 0–0.5)
LYMPHOCYTES # BLD AUTO: 2.17 10*3/MM3 (ref 0.7–3.1)
LYMPHOCYTES NFR BLD AUTO: 22.9 % (ref 19.6–45.3)
MCH RBC QN AUTO: 29.4 PG (ref 26.6–33)
MCHC RBC AUTO-ENTMCNC: 33 G/DL (ref 31.5–35.7)
MCV RBC AUTO: 89 FL (ref 79–97)
MONOCYTES # BLD AUTO: 0.74 10*3/MM3 (ref 0.1–0.9)
MONOCYTES NFR BLD AUTO: 7.8 % (ref 5–12)
NEUTROPHILS NFR BLD AUTO: 6.33 10*3/MM3 (ref 1.7–7)
NEUTROPHILS NFR BLD AUTO: 66.8 % (ref 42.7–76)
NRBC BLD AUTO-RTO: 0 /100 WBC (ref 0–0.2)
PLATELET # BLD AUTO: 213 10*3/MM3 (ref 140–450)
PMV BLD AUTO: 9.2 FL (ref 6–12)
POTASSIUM SERPL-SCNC: 3.1 MMOL/L (ref 3.5–5.2)
PROT SERPL-MCNC: 6.3 G/DL (ref 6–8.5)
QT INTERVAL: 348 MS
QTC INTERVAL: 537 MS
RBC # BLD AUTO: 3.74 10*6/MM3 (ref 3.77–5.28)
SODIUM SERPL-SCNC: 140 MMOL/L (ref 136–145)
WBC NRBC COR # BLD AUTO: 9.48 10*3/MM3 (ref 3.4–10.8)

## 2023-12-19 PROCEDURE — HZ2ZZZZ DETOXIFICATION SERVICES FOR SUBSTANCE ABUSE TREATMENT: ICD-10-PCS | Performed by: PSYCHIATRY & NEUROLOGY

## 2023-12-19 PROCEDURE — 80074 ACUTE HEPATITIS PANEL: CPT | Performed by: PSYCHIATRY & NEUROLOGY

## 2023-12-19 PROCEDURE — 25010000002 KETOROLAC TROMETHAMINE PER 15 MG: Performed by: STUDENT IN AN ORGANIZED HEALTH CARE EDUCATION/TRAINING PROGRAM

## 2023-12-19 PROCEDURE — 80053 COMPREHEN METABOLIC PANEL: CPT | Performed by: PSYCHIATRY & NEUROLOGY

## 2023-12-19 PROCEDURE — 83605 ASSAY OF LACTIC ACID: CPT | Performed by: PSYCHIATRY & NEUROLOGY

## 2023-12-19 PROCEDURE — 25010000002 LORAZEPAM PER 2 MG: Performed by: STUDENT IN AN ORGANIZED HEALTH CARE EDUCATION/TRAINING PROGRAM

## 2023-12-19 PROCEDURE — 85025 COMPLETE CBC W/AUTO DIFF WBC: CPT | Performed by: PSYCHIATRY & NEUROLOGY

## 2023-12-19 PROCEDURE — 96375 TX/PRO/DX INJ NEW DRUG ADDON: CPT

## 2023-12-19 PROCEDURE — 99223 1ST HOSP IP/OBS HIGH 75: CPT | Performed by: PSYCHIATRY & NEUROLOGY

## 2023-12-19 PROCEDURE — 96376 TX/PRO/DX INJ SAME DRUG ADON: CPT

## 2023-12-19 RX ORDER — ALUMINA, MAGNESIA, AND SIMETHICONE 2400; 2400; 240 MG/30ML; MG/30ML; MG/30ML
15 SUSPENSION ORAL EVERY 6 HOURS PRN
Status: DISCONTINUED | OUTPATIENT
Start: 2023-12-19 | End: 2023-12-22 | Stop reason: HOSPADM

## 2023-12-19 RX ORDER — BENZTROPINE MESYLATE 1 MG/ML
1 INJECTION INTRAMUSCULAR; INTRAVENOUS ONCE AS NEEDED
Status: DISCONTINUED | OUTPATIENT
Start: 2023-12-19 | End: 2023-12-22 | Stop reason: HOSPADM

## 2023-12-19 RX ORDER — MULTIVITAMIN WITH IRON
2 TABLET ORAL DAILY
Status: DISCONTINUED | OUTPATIENT
Start: 2023-12-19 | End: 2023-12-22 | Stop reason: HOSPADM

## 2023-12-19 RX ORDER — LOPERAMIDE HYDROCHLORIDE 2 MG/1
2 CAPSULE ORAL
Status: DISCONTINUED | OUTPATIENT
Start: 2023-12-19 | End: 2023-12-22 | Stop reason: HOSPADM

## 2023-12-19 RX ORDER — ONDANSETRON 4 MG/1
4 TABLET, FILM COATED ORAL EVERY 6 HOURS PRN
Status: DISCONTINUED | OUTPATIENT
Start: 2023-12-19 | End: 2023-12-19

## 2023-12-19 RX ORDER — KETOROLAC TROMETHAMINE 30 MG/ML
30 INJECTION, SOLUTION INTRAMUSCULAR; INTRAVENOUS ONCE
Status: COMPLETED | OUTPATIENT
Start: 2023-12-19 | End: 2023-12-19

## 2023-12-19 RX ORDER — BENZTROPINE MESYLATE 1 MG/1
2 TABLET ORAL ONCE AS NEEDED
Status: DISCONTINUED | OUTPATIENT
Start: 2023-12-19 | End: 2023-12-22 | Stop reason: HOSPADM

## 2023-12-19 RX ORDER — SUMATRIPTAN 50 MG/1
50 TABLET, FILM COATED ORAL
Status: DISCONTINUED | OUTPATIENT
Start: 2023-12-19 | End: 2023-12-22 | Stop reason: HOSPADM

## 2023-12-19 RX ORDER — NICOTINE 21 MG/24HR
1 PATCH, TRANSDERMAL 24 HOURS TRANSDERMAL
Status: DISCONTINUED | OUTPATIENT
Start: 2023-12-19 | End: 2023-12-22 | Stop reason: HOSPADM

## 2023-12-19 RX ORDER — IBUPROFEN 400 MG/1
400 TABLET ORAL EVERY 6 HOURS PRN
Status: DISCONTINUED | OUTPATIENT
Start: 2023-12-19 | End: 2023-12-22 | Stop reason: HOSPADM

## 2023-12-19 RX ORDER — LORAZEPAM 1 MG/1
1 TABLET ORAL
Qty: 3 TABLET | Refills: 0 | Status: DISCONTINUED | OUTPATIENT
Start: 2023-12-22 | End: 2023-12-22 | Stop reason: HOSPADM

## 2023-12-19 RX ORDER — ACETAMINOPHEN 325 MG/1
650 TABLET ORAL EVERY 6 HOURS PRN
Status: DISCONTINUED | OUTPATIENT
Start: 2023-12-19 | End: 2023-12-22 | Stop reason: HOSPADM

## 2023-12-19 RX ORDER — ECHINACEA PURPUREA EXTRACT 125 MG
2 TABLET ORAL AS NEEDED
Status: DISCONTINUED | OUTPATIENT
Start: 2023-12-19 | End: 2023-12-22 | Stop reason: HOSPADM

## 2023-12-19 RX ORDER — LORAZEPAM 0.5 MG/1
0.5 TABLET ORAL EVERY 4 HOURS PRN
Status: DISCONTINUED | OUTPATIENT
Start: 2023-12-23 | End: 2023-12-22 | Stop reason: HOSPADM

## 2023-12-19 RX ORDER — LORAZEPAM 2 MG/1
2 TABLET ORAL
Qty: 3 TABLET | Refills: 0 | Status: COMPLETED | OUTPATIENT
Start: 2023-12-20 | End: 2023-12-20

## 2023-12-19 RX ORDER — MULTIPLE VITAMINS W/ MINERALS TAB 9MG-400MCG
1 TAB ORAL DAILY
Status: DISCONTINUED | OUTPATIENT
Start: 2023-12-19 | End: 2023-12-22 | Stop reason: HOSPADM

## 2023-12-19 RX ORDER — POTASSIUM CHLORIDE 20 MEQ/1
40 TABLET, EXTENDED RELEASE ORAL EVERY 4 HOURS
Status: COMPLETED | OUTPATIENT
Start: 2023-12-19 | End: 2023-12-19

## 2023-12-19 RX ORDER — BENZONATATE 100 MG/1
100 CAPSULE ORAL 3 TIMES DAILY PRN
Status: DISCONTINUED | OUTPATIENT
Start: 2023-12-19 | End: 2023-12-22 | Stop reason: HOSPADM

## 2023-12-19 RX ORDER — LORAZEPAM 2 MG/1
2 TABLET ORAL
Status: DISPENSED | OUTPATIENT
Start: 2023-12-19 | End: 2023-12-20

## 2023-12-19 RX ORDER — LORAZEPAM 0.5 MG/1
0.5 TABLET ORAL
Qty: 3 TABLET | Refills: 0 | Status: DISCONTINUED | OUTPATIENT
Start: 2023-12-23 | End: 2023-12-22 | Stop reason: HOSPADM

## 2023-12-19 RX ORDER — MIRTAZAPINE 15 MG/1
15 TABLET, FILM COATED ORAL NIGHTLY
Status: DISCONTINUED | OUTPATIENT
Start: 2023-12-19 | End: 2023-12-21

## 2023-12-19 RX ORDER — CALCIUM CARBONATE 500(1250)
500 TABLET ORAL 2 TIMES DAILY
Status: COMPLETED | OUTPATIENT
Start: 2023-12-19 | End: 2023-12-19

## 2023-12-19 RX ORDER — LORAZEPAM 2 MG/1
2 TABLET ORAL EVERY 4 HOURS PRN
Status: DISPENSED | OUTPATIENT
Start: 2023-12-20 | End: 2023-12-21

## 2023-12-19 RX ORDER — CEPHALEXIN 500 MG/1
500 CAPSULE ORAL EVERY 12 HOURS SCHEDULED
Status: DISCONTINUED | OUTPATIENT
Start: 2023-12-19 | End: 2023-12-22 | Stop reason: HOSPADM

## 2023-12-19 RX ORDER — METOPROLOL TARTRATE 1 MG/ML
5 INJECTION, SOLUTION INTRAVENOUS ONCE
Status: COMPLETED | OUTPATIENT
Start: 2023-12-19 | End: 2023-12-19

## 2023-12-19 RX ORDER — TRAZODONE HYDROCHLORIDE 50 MG/1
50 TABLET ORAL NIGHTLY PRN
Status: DISCONTINUED | OUTPATIENT
Start: 2023-12-19 | End: 2023-12-19

## 2023-12-19 RX ORDER — SUMATRIPTAN 50 MG/1
50 TABLET, FILM COATED ORAL
COMMUNITY

## 2023-12-19 RX ORDER — HYDROXYZINE 50 MG/1
50 TABLET, FILM COATED ORAL EVERY 6 HOURS PRN
Status: DISCONTINUED | OUTPATIENT
Start: 2023-12-19 | End: 2023-12-19

## 2023-12-19 RX ORDER — FAMOTIDINE 20 MG/1
20 TABLET, FILM COATED ORAL 2 TIMES DAILY PRN
Status: DISCONTINUED | OUTPATIENT
Start: 2023-12-19 | End: 2023-12-22 | Stop reason: HOSPADM

## 2023-12-19 RX ORDER — LORAZEPAM 1 MG/1
1 TABLET ORAL EVERY 4 HOURS PRN
Status: DISCONTINUED | OUTPATIENT
Start: 2023-12-22 | End: 2023-12-22 | Stop reason: HOSPADM

## 2023-12-19 RX ADMIN — HYDROXYZINE HYDROCHLORIDE 50 MG: 50 TABLET ORAL at 02:00

## 2023-12-19 RX ADMIN — LORAZEPAM 0.5 MG: 2 INJECTION INTRAMUSCULAR; INTRAVENOUS at 00:02

## 2023-12-19 RX ADMIN — Medication 500 MG: at 21:02

## 2023-12-19 RX ADMIN — LORAZEPAM 2 MG: 2 TABLET ORAL at 00:09

## 2023-12-19 RX ADMIN — Medication 2 TABLET: at 08:21

## 2023-12-19 RX ADMIN — LORAZEPAM 2 MG: 2 TABLET ORAL at 08:21

## 2023-12-19 RX ADMIN — CEPHALEXIN 500 MG: 500 CAPSULE ORAL at 08:23

## 2023-12-19 RX ADMIN — METOPROLOL TARTRATE 5 MG: 1 INJECTION, SOLUTION INTRAVENOUS at 00:24

## 2023-12-19 RX ADMIN — MIRTAZAPINE 15 MG: 15 TABLET, FILM COATED ORAL at 21:02

## 2023-12-19 RX ADMIN — POTASSIUM CHLORIDE 40 MEQ: 1500 TABLET, EXTENDED RELEASE ORAL at 13:05

## 2023-12-19 RX ADMIN — HYDROXYZINE HYDROCHLORIDE 50 MG: 50 TABLET ORAL at 08:21

## 2023-12-19 RX ADMIN — LORAZEPAM 2 MG: 2 TABLET ORAL at 21:02

## 2023-12-19 RX ADMIN — LORAZEPAM 2 MG: 2 TABLET ORAL at 13:05

## 2023-12-19 RX ADMIN — Medication 100 MG: at 08:21

## 2023-12-19 RX ADMIN — POTASSIUM CHLORIDE 40 MEQ: 1500 TABLET, EXTENDED RELEASE ORAL at 21:02

## 2023-12-19 RX ADMIN — Medication 1 TABLET: at 08:21

## 2023-12-19 RX ADMIN — KETOROLAC TROMETHAMINE 30 MG: 30 INJECTION, SOLUTION INTRAMUSCULAR; INTRAVENOUS at 00:51

## 2023-12-19 RX ADMIN — Medication 500 MG: at 14:37

## 2023-12-19 RX ADMIN — LORAZEPAM 2 MG: 2 TABLET ORAL at 02:00

## 2023-12-19 RX ADMIN — POTASSIUM CHLORIDE 40 MEQ: 1500 TABLET, EXTENDED RELEASE ORAL at 17:18

## 2023-12-19 RX ADMIN — CEPHALEXIN 500 MG: 500 CAPSULE ORAL at 21:02

## 2023-12-19 NOTE — ED NOTES
"Pt is Awake,  alert, oriented X 4.  Speech normal.  Mood/affect normal.  Moves all extremities equally.  No motor deficit.  No sensory deficit.     Pupils equal, round and reactive to light.  Mucous membranes are pink and intact.    No respiratory distress.  Respirations not labored.  Chest nontender.  Breath sounds within normal limits.    S1,S2 auscultated.  Apical pulse noted to be tachycardic.  Pulses noted to be +2 and tachycardic.  Capillary refill less than 2 seconds.    Abdomen soft and nontender.  Bowel sounds within normal limits.  No nausea noted.  No emesis noted.    Capillary refill is less than 2 seconds in the extremities.  Extremity pulses are within normal limits.  Extremities exhibit normal ROM.  No motor deficit in the extremities.  Sensation intact.  No upper extremity edema.  No lower extremity edema.    Skin is warm and dry.  Normal skin turgor.  Skin color is within normal limits for race.  Skin color is pink.      Patient advises she came to ED for detox from alcohol today, when asked how long patient has been drinking alcohol she states, \"a long time.\" Patient states she drinks approx a fifth a day. States she has had \"around a pint\" today and her last drink was \"a couple of hours ago.\" Patient denies any chest pain, SOB, nausea, vomiting, sweating, hallucinations, etc. Patient states she does feel \"shaky.\" Patient is not noted to be visibly shaking. GCS 15. Patient adamantly denies SI/HI.    Call light in reach. All safety measures in place. NADN.  "

## 2023-12-19 NOTE — ED PROVIDER NOTES
Subjective   History of Present Illness  43-year-old female who presents to the ED today for a detox evaluation.  She reports that she needs detox from alcohol.  She states she drinks a fifth or more per day.  She states her last use was about 2 to 3 hours prior to arrival.  She states currently she feels like her heart rate is elevated and she is having some facial twitching.  She also thinks that she has a urinary tract infection.  She denies any drug use and denies any suicidal ideations.  She states she has been taking Adipex and Pyridium recently.    History provided by:  Patient  Drug / Alcohol Assessment  Primary symptoms comment: requesting detox. This is a new problem. The current episode started 1 to 2 hours ago. The problem has been gradually worsening. Suspected agents include alcohol. Pertinent negatives include no fever, no nausea and no vomiting. Associated medical issues include addiction treatment.       Review of Systems   Constitutional: Negative.  Negative for fever.   HENT: Negative.     Eyes: Negative.    Respiratory: Negative.     Cardiovascular:  Positive for palpitations.   Gastrointestinal: Negative.  Negative for nausea and vomiting.   Genitourinary:  Positive for dysuria, frequency and urgency.   Musculoskeletal: Negative.    Skin: Negative.    Neurological: Negative.    Psychiatric/Behavioral:  Negative for suicidal ideas.    All other systems reviewed and are negative.      Past Medical History:   Diagnosis Date    Alcohol abuse     Delirium     Depression     Kidney stone     Migraines     PTSD (post-traumatic stress disorder)     Urinary tract infection     Withdrawal symptoms, alcohol        Allergies   Allergen Reactions    Reglan [Metoclopramide] Rash       Past Surgical History:   Procedure Laterality Date     SECTION  ;      SECTION      CHOLECYSTECTOMY      CYSTOSCOPY BLADDER STONE LITHOTRIPSY  ;      DILATION AND CURETTAGE, DIAGNOSTIC /  THERAPEUTIC  2012       Family History   Problem Relation Age of Onset    Drug abuse Mother     Drug abuse Father     Cancer Maternal Grandmother     Heart disease Maternal Grandmother     Stroke Maternal Grandmother     Cancer Paternal Grandfather     Heart disease Paternal Grandfather     Diabetes Paternal Grandfather        Social History     Socioeconomic History    Marital status:      Spouse name: Lino Morales    Number of children: 3    Years of education: ADN   Tobacco Use    Smoking status: Every Day     Packs/day: 1.00     Years: 10.00     Additional pack years: 0.00     Total pack years: 10.00     Types: Cigarettes    Smokeless tobacco: Never   Vaping Use    Vaping Use: Every day    Substances: Nicotine    Devices: Disposable   Substance and Sexual Activity    Alcohol use: Yes     Comment: 5th vodka daily    Drug use: Yes     Types: Methamphetamines, Cocaine(coke), Other     Comment: alcohol    Sexual activity: Yes     Partners: Male     Birth control/protection: Tubal ligation           Objective   Physical Exam  Vitals and nursing note reviewed.   Constitutional:       General: She is not in acute distress.     Appearance: Normal appearance. She is not diaphoretic.   HENT:      Head: Normocephalic and atraumatic.      Right Ear: External ear normal.      Left Ear: External ear normal.      Nose: Nose normal.   Eyes:      Conjunctiva/sclera: Conjunctivae normal.      Pupils: Pupils are equal, round, and reactive to light.   Cardiovascular:      Rate and Rhythm: Regular rhythm. Tachycardia present.      Pulses: Normal pulses.      Heart sounds: Normal heart sounds.   Pulmonary:      Effort: Pulmonary effort is normal.      Breath sounds: Normal breath sounds.   Abdominal:      General: Bowel sounds are normal.      Palpations: Abdomen is soft.   Musculoskeletal:         General: Normal range of motion.      Cervical back: Normal range of motion and neck supple.   Skin:     General: Skin is warm  and dry.      Capillary Refill: Capillary refill takes less than 2 seconds.   Neurological:      General: No focal deficit present.      Mental Status: She is alert and oriented to person, place, and time.   Psychiatric:         Mood and Affect: Mood is anxious.         Speech: Speech normal.         Behavior: Behavior normal. Behavior is cooperative.         Thought Content: Thought content does not include homicidal or suicidal ideation.         Procedures       Results for orders placed or performed during the hospital encounter of 12/18/23   Comprehensive Metabolic Panel    Specimen: Hand, Left; Blood   Result Value Ref Range    Glucose 171 (H) 65 - 99 mg/dL    BUN 7 6 - 20 mg/dL    Creatinine 0.76 0.57 - 1.00 mg/dL    Sodium 137 136 - 145 mmol/L    Potassium 2.9 (L) 3.5 - 5.2 mmol/L    Chloride 99 98 - 107 mmol/L    CO2 23.1 22.0 - 29.0 mmol/L    Calcium 8.5 (L) 8.6 - 10.5 mg/dL    Total Protein 7.5 6.0 - 8.5 g/dL    Albumin 4.1 3.5 - 5.2 g/dL    ALT (SGPT) 26 1 - 33 U/L    AST (SGOT) 39 (H) 1 - 32 U/L    Alkaline Phosphatase 97 39 - 117 U/L    Total Bilirubin 0.4 0.0 - 1.2 mg/dL    Globulin 3.4 gm/dL    A/G Ratio 1.2 g/dL    BUN/Creatinine Ratio 9.2 7.0 - 25.0    Anion Gap 14.9 5.0 - 15.0 mmol/L    eGFR 101.1 >60.0 mL/min/1.73   Urine Drug Screen - Urine, Clean Catch    Specimen: Urine, Clean Catch   Result Value Ref Range    THC, Screen, Urine Negative Negative    Phencyclidine (PCP), Urine Negative Negative    Cocaine Screen, Urine Negative Negative    Methamphetamine, Ur Positive (A) Negative    Opiate Screen Negative Negative    Amphetamine Screen, Urine Positive (A) Negative    Benzodiazepine Screen, Urine Negative Negative    Tricyclic Antidepressants Screen Negative Negative    Methadone Screen, Urine Negative Negative    Barbiturates Screen, Urine Negative Negative    Oxycodone Screen, Urine Negative Negative    Buprenorphine, Screen, Urine Negative Negative   Ethanol    Specimen: Hand, Left; Blood    Result Value Ref Range    Ethanol 168 (H) 0 - 10 mg/dL    Ethanol % 0.168 %   TSH    Specimen: Hand, Left; Blood   Result Value Ref Range    TSH 1.640 0.270 - 4.200 uIU/mL   Magnesium    Specimen: Hand, Left; Blood   Result Value Ref Range    Magnesium 1.7 1.6 - 2.6 mg/dL   Pregnancy, Urine - Urine, Clean Catch    Specimen: Urine, Clean Catch   Result Value Ref Range    HCG, Urine QL Negative Negative   Urinalysis With Microscopic If Indicated (No Culture) - Urine, Clean Catch    Specimen: Urine, Clean Catch   Result Value Ref Range    Color, UA Orange (A) Yellow, Straw    Appearance, UA Turbid (A) Clear    pH, UA 6.0 5.0 - 8.0    Specific Gravity, UA 1.012 1.005 - 1.030    Glucose, UA Negative Negative    Ketones, UA Negative Negative    Bilirubin, UA Small (1+) (A) Negative    Blood, UA Trace (A) Negative    Protein,  mg/dL (2+) (A) Negative    Leuk Esterase, UA Moderate (2+) (A) Negative    Nitrite, UA Positive (A) Negative    Urobilinogen, UA 1.0 E.U./dL 0.2 - 1.0 E.U./dL   CBC Auto Differential    Specimen: Hand, Left; Blood   Result Value Ref Range    WBC 11.28 (H) 3.40 - 10.80 10*3/mm3    RBC 4.18 3.77 - 5.28 10*6/mm3    Hemoglobin 12.2 12.0 - 15.9 g/dL    Hematocrit 37.1 34.0 - 46.6 %    MCV 88.8 79.0 - 97.0 fL    MCH 29.2 26.6 - 33.0 pg    MCHC 32.9 31.5 - 35.7 g/dL    RDW 13.9 12.3 - 15.4 %    RDW-SD 44.1 37.0 - 54.0 fl    MPV 9.0 6.0 - 12.0 fL    Platelets 249 140 - 450 10*3/mm3    Neutrophil % 76.1 (H) 42.7 - 76.0 %    Lymphocyte % 16.8 (L) 19.6 - 45.3 %    Monocyte % 5.7 5.0 - 12.0 %    Eosinophil % 0.6 0.3 - 6.2 %    Basophil % 0.4 0.0 - 1.5 %    Immature Grans % 0.4 0.0 - 0.5 %    Neutrophils, Absolute 8.59 (H) 1.70 - 7.00 10*3/mm3    Lymphocytes, Absolute 1.89 0.70 - 3.10 10*3/mm3    Monocytes, Absolute 0.64 0.10 - 0.90 10*3/mm3    Eosinophils, Absolute 0.07 0.00 - 0.40 10*3/mm3    Basophils, Absolute 0.05 0.00 - 0.20 10*3/mm3    Immature Grans, Absolute 0.04 0.00 - 0.05 10*3/mm3    nRBC 0.0  0.0 - 0.2 /100 WBC   Fentanyl, Urine - Urine, Clean Catch    Specimen: Urine, Clean Catch   Result Value Ref Range    Fentanyl, Urine Negative Negative   Urinalysis, Microscopic Only - Urine, Clean Catch    Specimen: Urine, Clean Catch   Result Value Ref Range    RBC, UA 0-2 None Seen, 0-2 /HPF    WBC, UA 6-10 (A) None Seen, 0-2 /HPF    Bacteria, UA 3+ (A) None Seen /HPF    Squamous Epithelial Cells, UA 0-2 None Seen, 0-2 /HPF    Hyaline Casts, UA 0-2 None Seen /LPF    Methodology Manual Light Microscopy    [place] Urine Culture Tube - Urine, Clean Catch    Specimen: Urine, Clean Catch   Result Value Ref Range    Extra Tube Hold for add-ons.    Lactic Acid, Plasma    Specimen: Hand, Left; Blood   Result Value Ref Range    Lactate 2.8 (C) 0.5 - 2.0 mmol/L   Procalcitonin    Specimen: Hand, Left; Blood   Result Value Ref Range    Procalcitonin 0.03 0.00 - 0.25 ng/mL   Sedimentation Rate    Specimen: Hand, Left; Blood   Result Value Ref Range    Sed Rate 24 (H) 0 - 20 mm/hr   C-reactive Protein    Specimen: Hand, Left; Blood   Result Value Ref Range    C-Reactive Protein 0.80 (H) 0.00 - 0.50 mg/dL   Ethanol    Specimen: Hand, Left; Blood   Result Value Ref Range    Ethanol 126 (H) 0 - 10 mg/dL    Ethanol % 0.126 %   STAT Lactic Acid, Reflex    Specimen: Arm, Left; Blood   Result Value Ref Range    Lactate 2.9 (C) 0.5 - 2.0 mmol/L   Ethanol    Specimen: Arm, Left; Blood   Result Value Ref Range    Ethanol 83 (H) 0 - 10 mg/dL    Ethanol % 0.083 %   ECG 12 Lead Tachycardia   Result Value Ref Range    QT Interval 348 ms    QTC Interval 537 ms   Green Top (Gel)   Result Value Ref Range    Extra Tube Hold for add-ons.    Lavender Top   Result Value Ref Range    Extra Tube hold for add-on    Gold Top - SST   Result Value Ref Range    Extra Tube Hold for add-ons.    Light Blue Top   Result Value Ref Range    Extra Tube Hold for add-ons.           ED Course  ED Course as of 12/19/23 0945   Mon Dec 18, 2023   1737 ECG 12 Lead  Tachycardia  Sinus tachycardia, rate 143, QTc 537, no acute ST or T wave changes []   1829 Ethanol(!): 168 []   1829 Potassium(!): 2.9 []   1940 Endorsed to Dr. Shanks []      ED Course User Index  [] Lilian Fletcher PA  [] Malcom Rodriguez, DO                                             Medical Decision Making  43-year-old female presents to the ED today for detox evaluation.  She was medically cleared for the evaluation.  Psychiatry was consulted and she will be admitted.    Problems Addressed:  Acute UTI: complicated acute illness or injury  Alcohol abuse: complicated acute illness or injury    Amount and/or Complexity of Data Reviewed  Labs: ordered. Decision-making details documented in ED Course.  ECG/medicine tests: ordered. Decision-making details documented in ED Course.    Risk  Prescription drug management.        Final diagnoses:   Alcohol abuse   Acute UTI   Electronically signed by KRISTEN Xie, 12/18/23, 7:32 PM EST.     ED Disposition  ED Disposition       ED Disposition   DC/Transfer to Behavioral Health    [facility]   Stable    Comment   --               No follow-up provider specified.       Medication List      No changes were made to your prescriptions during this visit.            Lilian Fletcher PA  12/19/23 0964

## 2023-12-19 NOTE — NURSING NOTE
Spoke to Dr. ELIDIA Chung via phone. Intake information provided. Reviewed all labs  and clinicals per narrative notes. Instructed to admit the patient. Admit orders received. SP4 routine orders. Ativan detox protocol. Consult hospital ist for medical management - UTI, abnormal labs. Keflex 500mg oral twice daily x 7 days for UTI. CBC, CMP, and Lactate in am.  PT is okay to transfer to detox unit if blood pressure is less than 160/100, heart rate 120 or less. RBTOx2. Patient and ED provider/Shanks made aware of plan of care. Safety precautions maintained.

## 2023-12-19 NOTE — ED NOTES
Checked lexicomp to check compatibility of ceftriaxone and folic acid, lexicomp advises these medication are Y site compatible. Spoke Gil in pharmacy to confirm and he advises folic acid and ceftriaxone are y site compatible

## 2023-12-19 NOTE — PLAN OF CARE
Goal Outcome Evaluation:  Plan of Care Reviewed With: patient  Patient Agreement with Plan of Care: agrees     Progress: improving  Outcome Evaluation: Patient appears calm and is cooperative with activities on unit today. Patient is drowsy most of shift but does come out of room for meal times, free time, and groups. Was able to sleep well and had fair appetite. Patient rates her anxiety is an 8 out of 10, depression an 8 out of 10, and cravings are a 6 out of 10. Patient denies SI/HI/AVH. Patient rates pain a 4 out of 10 and states that it is in her left flank. Patient is currently being treated for a UTI and passed kidney stones yesterday. Patient medicated per medication orders. No distress or complaints noted.

## 2023-12-19 NOTE — NURSING NOTE
Intake RN to ED 4 for detox clementine     44 y/o presents to ED with request for alcohol detox. Pt reports she drinks a 5th+ of vodka daily. Last drink reported approx eight hours ago.     Pt reports history of occasional meth use - reports last use was approx five days ago.     Denies SI/HI/AVH.    CIWA 7    Depression 9/10.  Anxiety 9/10.    Reports poor sleep.     Complaints of left lower back pain for approx five days. Reports history of kidney stones and felt like she recently had one. Reports that following kidney stones that she will usually get a UTI.     K+ 2.9 - treated with KCL 40 meq oral.  Glucose 171  Calcium 8.5  AST 39    CRP 0.80  Lactate 2.8, repeat 2.9.    WBC 11.28    Sed Rate 24    U/A  [place]   Turbid  Blood trace  Nitrite positive  Leukocytes moderate  Protein 100  Bilirubin small 1+  WBC 6-10  Bacteria 3+    Ethanol 168, 126, 83.    UDS  Amphetamine  Methamphetamine    EKG  Sinus Tachycardia  Possible Left atrial enlargement  Left ventricular hypertrophy  Nonspecific ST abnormality  Vent rate 143, Atrial rate 143    While in ED, pt received -   Thiamine 200mg IV @ 1900  Folic Acid 1mg IVPB @ 1918  NS 1000ml x2  KCL 40meq oral @ 1857  Rocephin 2GM IVPB @ 1919  Ativan 2mg oral @ 1857  Ativan 1mg @ 2306  Ativan 0.5mg IVP @ 0002  Ativan 2mg oral @ 0009  Lopressor 5mg IVP @ 0024  Toradol 30mg IVP @ 0051

## 2023-12-19 NOTE — PLAN OF CARE
Goal Outcome Evaluation:        Problem: Adult Behavioral Health Plan of Care  Goal: Patient-Specific Goal (Individualization)  Outcome: Ongoing, Progressing  Flowsheets  Taken 12/19/2023 1006  Patient-Specific Goals (Include Timeframe): Identify 2-3 coping skills, address relapse prevention methods, complete aftercare plans, and deny SI/HI prior to discharge.  Individualized Care Needs: Therapist to offer 1-4 therapy sessions, aftercare planning, safety planning, family education, group therapy, and brief CBT/MI interventions.  Anxieties, Fears or Concerns: none verbalized  Taken 12/19/2023 0980  Patient Personal Strengths:   resilient   resourceful   family/social support   positive vocational history   positive educational history   stable living environment   motivated for treatment   motivated for recovery   independent living skills   parenting skills   intellectual cognitive skills  Patient Vulnerabilities:   substance abuse/addiction   history of unsuccessful treatment   poor impulse control   traumatic event  Goal: Optimized Coping Skills in Response to Life Stressors  Outcome: Ongoing, Progressing  Flowsheets (Taken 12/19/2023 1006)  Optimized Coping Skills in Response to Life Stressors: making progress toward outcome  Intervention: Promote Effective Coping Strategies  Flowsheets (Taken 12/19/2023 1006)  Supportive Measures:   active listening utilized   counseling provided   decision-making supported   goal-setting facilitated   positive reinforcement provided   self-reflection promoted   self-responsibility promoted   self-care encouraged   relaxation techniques promoted   verbalization of feelings encouraged  Goal: Develops/Participates in Therapeutic [place] to Support Successful Transition  Outcome: Ongoing, Progressing  Flowsheets (Taken 12/19/2023 1006)  Develops/Participates in Therapeutic [place] to Support Successful Transition: making progress toward outcome  Intervention: Foster Therapeutic  Urine frequency for one week    [place]  Flowsheets (Taken 12/19/2023 1006)  Trust Relationship/Rapport:   care explained   questions encouraged   choices provided   reassurance provided   emotional support provided   thoughts/feelings acknowledged   empathic listening provided   questions answered  Intervention: Mutually Develop Transition Plan  Flowsheets  Taken 12/19/2023 1006  Outpatient/Agency/Support Group Needs:   outpatient substance abuse treatment (specify)   outpatient psychiatric care (specify)   outpatient medication management   outpatient counseling  Transition Support:   follow-up care discussed   follow-up care coordinated   community resources reviewed   crisis management plan promoted   crisis management plan verbalized  Anticipated Discharge Disposition: home or self-care  Taken 12/19/2023 1005  Discharge Coordination/Progress: Patient does not have insurance coverage. Therapist met with patient to complete assessment.  Transportation Anticipated: car, drives self  Transportation Concerns: none  Current Discharge Risk:   substance use/abuse   psychiatric illness  Concerns to be Addressed:   substance/tobacco abuse/use   coping/stress   cognitive/perceptual   mental health   discharge planning  Readmission Within the Last 30 Days: no previous admission in last 30 days  Patient/Family Anticipated Services at Transition:   mental health services   outpatient care  Patient's Choice of Community Agency(s): to be determined  Patient/Family Anticipates Transition to: home  Offered/Gave Vendor List: yes         DATA:      Therapist met individually with patient this date to introduce role and to discuss hospitalization expectations. Patient agreeable.      Clinical Maneuvering/Intervention:     Therapist assisted patient in processing above session content; acknowledged and normalized patient’s thoughts, feelings, and concerns.  Discussed the therapist/patient relationship and explain the parameters and limitations of relative  confidentiality.  Also discussed the importance of active participation, and honesty to the treatment process.  Encouraged the patient to discuss/vent their feelings, frustrations, and fears concerning their ongoing medical issues and validated their feelings.     Discussed the importance of finding enjoyable activities and coping skills that the patient can engage in a regular basis. Discussed healthy coping skills such as distraction, self love, grounding, thought challenges/reframing, etc.  Provided patient with list of healthy coping skills this date. Discussed the importance of medication compliance.  Praised the patient for seeking help and spent the majority of the session building rapport.       Allowed patient to freely discuss issues without interruption or judgment. Provided safe, confidential environment to facilitate the development of positive therapeutic relationship and encourage open, honest communication.      Therapist addressed discharge safety planning this date. Assisted patient in identifying risk factors which would indicate the need for higher level of care after discharge;  including thoughts to harm self or others and/or self-harming behavior. Encouraged patient to call 911, or present to the nearest emergency room should any of these events occur. Discussed crisis intervention services and means to access.  Encouraged securing any objects of harm.       Therapist completed integrated summary, treatment plan, and initiated social history this date.  Therapist is strongly encouraging family involvement in treatment.       ASSESSMENT:      The patient is a 44 y/o  female admitted for detox treatment and polysubstance abuse. Therapist met briefly with patient at this time to complete assessment, patient had some difficulty waking and staying awake. She denies SI/HI/AVH. Reports experiencing nausea, shakes, and skin burning sensation. Patient reports use of alcohol, methamphetamine,  and cocaine. Last [facility] [place] 2[place] admission was on 9/16/23. Patient presents from home and plans to return at discharge in order to return to work, will encourage outpatient aftercare. Patient has an associate's degree and normally works as a nurse. Patient did not consent to family involvement in treatment at this time.      PLAN:       Patient to remain hospitalized this date.     Treatment team will focus efforts on stabilizing patient's acute symptoms while providing education on healthy coping and crisis management to reduce hospitalizations.   Patient requires daily psychiatrist evaluation and 24/7 nursing supervision to promote patient  safety.     Therapist will offer 1-4 individual sessions, 1 therapy group daily, family education, and appropriate referral.    Therapist recommends [facility].

## 2023-12-19 NOTE — H&P
INITIAL PSYCHIATRIC HISTORY & PHYSICAL    Patient Identification:  Name:  Gee Morales  Age:  43 y.o.  Sex:  female  :  1980  MRN:  [id number]   Visit Number:  [id number]  Primary Care Physician:  Provider, No Known    SUBJECTIVE    CC/Focus of Exam: alcohol use    HPI: Gee Morales is a 43 y.o. female who was admitted on 2023 with complaints of alcohol use and withdrawals. The patient reports a long history of substance use. First use was at age 16. Over time the use increased and the patient  continued to use despite negative consequences. The patient endorses symptoms of tolerance and withdrawals. Has tried to cut down and stop but has not been successful. Spends too much time and resources in pursuit of substance use. Longest period of sobriety is reported to be 11 months.  Currently using a fifth of vodka daily.   Last use of alcohol was yesterday. She also reports occasional meth use and last use was five days ago. She states she uses meth once every three months and snorts it. She also reports using THC gummies on a regular basis.   Withdrawal symptoms include skin is burning, nausea, shakes.     PAST PSYCHIATRIC HX: None reported.     SUBSTANCE USE HX: See HPI for current use. She reports a history of pain pill use and was in a Suboxone clinic for 3 years for Opioid use disorder severe.      SOCIAL HX:   Social History     Socioeconomic History    Marital status:      Spouse name: Lino Morales    Number of children: 3    Years of education: ADN   Tobacco Use    Smoking status: Every Day     Packs/day: 1.00     Years: 10.00     Additional pack years: 0.00     Total pack years: 10.00     Types: Cigarettes    Smokeless tobacco: Never   Vaping Use    Vaping Use: Every day    Substances: Nicotine    Devices: Disposable   Substance and Sexual Activity    Alcohol use: Yes     Comment: 5th vodka daily    Drug use: Yes     Types: Methamphetamines, Cocaine(coke), Other     Comment:  alcohol    Sexual activity: Yes     Partners: Male     Birth control/protection: Tubal ligation         Past Medical History:   Diagnosis Date    Alcohol abuse     Delirium     Depression     Kidney stone     Migraines     PTSD (post-traumatic stress disorder)     Urinary tract infection     Withdrawal symptoms, alcohol           Past Surgical History:   Procedure Laterality Date     SECTION  ;      SECTION      CHOLECYSTECTOMY  2012    CYSTOSCOPY BLADDER STONE LITHOTRIPSY  ;      DILATION AND CURETTAGE, DIAGNOSTIC / THERAPEUTIC         Family History   Problem Relation Age of Onset    Drug abuse Mother     Drug abuse Father     Cancer Maternal Grandmother     Heart disease Maternal Grandmother     Stroke Maternal Grandmother     Cancer Paternal Grandfather     Heart disease Paternal Grandfather     Diabetes Paternal Grandfather          Medications Prior to Admission   Medication Sig Dispense Refill Last Dose    SUMAtriptan (IMITREX) 50 MG tablet Take 1 tablet by mouth Every 2 (Two) Hours As Needed for Migraine. Take one tablet at onset of headache. May repeat dose one time in 2 hours if headache not relieved.   Past Week         ALLERGIES:  Reglan [metoclopramide]    Temp:  [97.3 °F (36.3 °C)-98.3 °F (36.8 °C)] 97.5 °F (36.4 °C)  Heart Rate:  [] 95  Resp:  [18-20] 18  BP: ()/() 97/63    REVIEW OF SYSTEMS:  Review of Systems   Constitutional:  Positive for chills, diaphoresis and fatigue.   HENT: Negative.     Eyes: Negative.    Respiratory: Negative.     Cardiovascular: Negative.    Gastrointestinal:  Positive for nausea.   Endocrine: Negative.    Genitourinary:  Positive for dysuria.   Musculoskeletal:  Positive for back pain.   Allergic/Immunologic: Negative.    Neurological:  Positive for tremors.   Hematological: Negative.    Psychiatric/Behavioral:  Positive for dysphoric mood. The patient is nervous/anxious.         OBJECTIVE    PHYSICAL EXAM:  Physical  Exam  Constitutional:  Appears well-developed and well-nourished.   HENT:   Head: Normocephalic and atraumatic.   Right Ear: External ear normal.   Left Ear: External ear normal.   Mouth/Throat: Oropharynx is clear and moist.   Eyes: Pupils are equal, round, and reactive to light. Conjunctivae and EOM are normal.   Neck: Normal range of motion. Neck supple.   Cardiovascular: Normal rate, regular rhythm and normal heart sounds.    Respiratory: Effort normal and breath sounds normal. No respiratory distress. No wheezes.   GI: Soft. Bowel sounds are normal.No distension. There is no tenderness.   Musculoskeletal: Normal range of motion. No edema or deformity.   Neurological:No cranial nerve deficit. Coordination normal.   Skin: Skin is warm and dry. No rash noted. No erythema.     MENTAL STATUS EXAM:   Hygiene:   fair  Cooperation:  Cooperative  Eye Contact:  Fair  Psychomotor Behavior:  Appropriate  Affect:  Appropriate  Hopelessness: 8/10  Speech:  Normal  Thought Process: Goal directed  Thought Content:  Normal  Suicidal:  None  Homicidal:  None  Hallucinations:  None  Delusion:  None  Memory:  Intact  Orientation:  Person, Place, Time and Situation  Reliability:  fair  Insight:  Fair  Judgement:  Fair  Impulse Control:  Fair    Imaging Results (Last 24 Hours)       ** No results found for the last 24 hours. **             ECG/EMG Results (most recent)       None             Lab Results   Component Value Date    GLUCOSE 120 (H) 12/19/2023    BUN 8 12/19/2023    CREATININE 0.75 12/19/2023    EGFRIFNONA 78 10/14/2020    BCR 10.7 12/19/2023    CO2 25.3 12/19/2023    CALCIUM 7.3 (L) 12/19/2023    ALBUMIN 3.5 12/19/2023    AST 29 12/19/2023    ALT 18 12/19/2023       Lab Results   Component Value Date    WBC 9.48 12/19/2023    HGB 11.0 (L) 12/19/2023    HCT 33.3 (L) 12/19/2023    MCV 89.0 12/19/2023     12/19/2023       Last Urine Toxicity  More data exists         Latest Ref Rng & Units 12/18/2023 9/16/2023    LAST URINE TOXICITY RESULTS   Amphetamine, Urine Qual Negative Positive  Positive    Barbiturates Screen, Urine Negative Negative  Negative    Benzodiazepine Screen, Urine Negative Negative  Positive    Buprenorphine, Screen, Urine Negative Negative  Negative    Cocaine Screen, Urine Negative Negative  Negative    Fentanyl, Urine Negative Negative  Negative    Methadone Screen , Urine Negative Negative  Negative    Methamphetamine, Ur Negative Positive  Negative        Brief Urine Lab Results  (Last result in the past 365 days)        Color   Clarity   Blood   Leuk Est   Nitrite   Protein   CREAT   Urine HCG        12/18/23 1750 Orange   Turbid   Trace   Moderate (2+)   Positive   100 mg/dL (2+)           12/18/23 1750               Negative               DATA  Labs reviewed. Potassium 3.. glucose 120. Calcium 7.3. Hemoglobin 11, hematocrit 33.3. Hep C positive. UA shows orange color, turbid appearance, trace blood, nitrite positive, moderate leukocytes, protein, bilirubin, 6-10 WBC, bacteria 3+. B;lood alcohol level 126 mg/dL. UDS positive for amphetamine, methamphetamine.   EKG reviewed. QTc 537 ms  RASHEED reviewed.   Record reviewed. The patient was last here in September 2023 and was treated for alcohol use disorder.     Strengths: Motivated for treatment and Artic[place]    Weaknesses:Substance use and Poor coping skills    Code status:  full  Discussed code status with patient.    ASSESSMENT & PLAN:    Hospital bed: No      Alcohol use disorder, severe, dependence    Alcohol withdrawal  -Ativan detox  -Thiamine and folate      Depressive disorder unspecified  -Start mirtazapine 15 mg hs      Nicotine use disorder  -Encouraged cessation      Methamphetamine use disorder, mild  -Supportive treatment      Opioid use disorder, moderate, in sustained remission  -Encouraged ongoing sobriety      Hypokalemia and hypocalcemia  -Replacement per protocol      QTc prolongation  -Stop hydroxyzine, ondansetron and  trazodone  -Correct hypokalemia  -Repeat EKG in am      UTI  -Keflex started in the ED    The patient has been admitted for safety and stabilization.  Patient will be monitored for suicidality daily and maintained on Special Precautions Level 4 (q30 min checks).  The patient will have individual and group therapy with a master's level therapist. A master treatment plan will be developed and agreed upon by the patient and his/her treatment team.  The patient's estimated length of stay in the hospital is 5-7 days.

## 2023-12-19 NOTE — PLAN OF CARE
Goal Outcome Evaluation:  Plan of Care Reviewed With: patient  Patient Agreement with Plan of Care: agrees     Progress: no change  Outcome Evaluation: Pt presented to the ED and reports drinking a 5th of vodka/day, last drink 12/18. Pt also reports occasional meth use; last use 12/13. Pt last here 9/16-9/19. Pt reports recent kidney stone and that usually has UTI following; ER provider confirmed UTI, prescribed Keflex 500mg twice/day for 7 days. K+ 2.9, given 40 mEq, lactate 2.9, CRP 0.80, calcium 8.5, glucose 171, AST 39, WBC 11.28; repeat CBC & CMP in AM. Gait unsteady upon arrival to unit; pt placed on fall precautions. Pt plans to return home upon discharge, stating “I have to work.” Hx of kidney stones, PTSD, migraines.

## 2023-12-19 NOTE — NURSING NOTE
Pt to intake. Search completed with 2 staff members present. Items placed in cabinet.    ED clearance provided by KRISTEN Fletcher/Dimitris - Dr. Shanks recommends Keflex 500mg oral twice daily x 7 days for UTI.

## 2023-12-20 LAB
ALBUMIN SERPL-MCNC: 3.4 G/DL (ref 3.5–5.2)
ALBUMIN/GLOB SERPL: 1.2 G/DL
ALP SERPL-CCNC: 78 U/L (ref 39–117)
ALT SERPL W P-5'-P-CCNC: 16 U/L (ref 1–33)
ANION GAP SERPL CALCULATED.3IONS-SCNC: 6.3 MMOL/L (ref 5–15)
AST SERPL-CCNC: 24 U/L (ref 1–32)
BACTERIA SPEC AEROBE CULT: ABNORMAL
BILIRUB SERPL-MCNC: 0.2 MG/DL (ref 0–1.2)
BUN SERPL-MCNC: 14 MG/DL (ref 6–20)
BUN/CREAT SERPL: 18.7 (ref 7–25)
CALCIUM SPEC-SCNC: 9.2 MG/DL (ref 8.6–10.5)
CHLORIDE SERPL-SCNC: 110 MMOL/L (ref 98–107)
CO2 SERPL-SCNC: 24.7 MMOL/L (ref 22–29)
CREAT SERPL-MCNC: 0.75 MG/DL (ref 0.57–1)
EGFRCR SERPLBLD CKD-EPI 2021: 102.7 ML/MIN/1.73
GLOBULIN UR ELPH-MCNC: 2.9 GM/DL
GLUCOSE SERPL-MCNC: 119 MG/DL (ref 65–99)
POTASSIUM SERPL-SCNC: 4.4 MMOL/L (ref 3.5–5.2)
PROT SERPL-MCNC: 6.3 G/DL (ref 6–8.5)
QT INTERVAL: 354 MS
QTC INTERVAL: 459 MS
SODIUM SERPL-SCNC: 141 MMOL/L (ref 136–145)

## 2023-12-20 PROCEDURE — 93005 ELECTROCARDIOGRAM TRACING: CPT | Performed by: PSYCHIATRY & NEUROLOGY

## 2023-12-20 PROCEDURE — 99232 SBSQ HOSP IP/OBS MODERATE 35: CPT | Performed by: PSYCHIATRY & NEUROLOGY

## 2023-12-20 PROCEDURE — 80053 COMPREHEN METABOLIC PANEL: CPT | Performed by: PSYCHIATRY & NEUROLOGY

## 2023-12-20 RX ORDER — CLONIDINE HYDROCHLORIDE 0.1 MG/1
0.1 TABLET ORAL ONCE
Status: COMPLETED | OUTPATIENT
Start: 2023-12-20 | End: 2023-12-20

## 2023-12-20 RX ORDER — CHLORDIAZEPOXIDE HYDROCHLORIDE 25 MG/1
25 CAPSULE, GELATIN COATED ORAL ONCE
Status: COMPLETED | OUTPATIENT
Start: 2023-12-20 | End: 2023-12-20

## 2023-12-20 RX ADMIN — LORAZEPAM 2 MG: 2 TABLET ORAL at 08:20

## 2023-12-20 RX ADMIN — CEPHALEXIN 500 MG: 500 CAPSULE ORAL at 08:20

## 2023-12-20 RX ADMIN — MIRTAZAPINE 15 MG: 15 TABLET, FILM COATED ORAL at 21:13

## 2023-12-20 RX ADMIN — CEPHALEXIN 500 MG: 500 CAPSULE ORAL at 22:14

## 2023-12-20 RX ADMIN — IBUPROFEN 400 MG: 400 TABLET, FILM COATED ORAL at 08:20

## 2023-12-20 RX ADMIN — LORAZEPAM 2 MG: 2 TABLET ORAL at 18:56

## 2023-12-20 RX ADMIN — LORAZEPAM 2 MG: 2 TABLET ORAL at 21:13

## 2023-12-20 RX ADMIN — Medication 100 MG: at 08:20

## 2023-12-20 RX ADMIN — CLONIDINE HYDROCHLORIDE 0.1 MG: 0.1 TABLET ORAL at 21:13

## 2023-12-20 RX ADMIN — Medication 1 PATCH: at 08:20

## 2023-12-20 RX ADMIN — CHLORDIAZEPOXIDE HYDROCHLORIDE 25 MG: 25 CAPSULE ORAL at 23:01

## 2023-12-20 RX ADMIN — METOPROLOL TARTRATE 50 MG: 25 TABLET, FILM COATED ORAL at 23:01

## 2023-12-20 RX ADMIN — SUMATRIPTAN SUCCINATE 50 MG: 50 TABLET, FILM COATED ORAL at 23:01

## 2023-12-20 RX ADMIN — LORAZEPAM 2 MG: 2 TABLET ORAL at 14:30

## 2023-12-20 RX ADMIN — Medication 1 TABLET: at 08:20

## 2023-12-20 RX ADMIN — LORAZEPAM 2 MG: 2 TABLET ORAL at 00:41

## 2023-12-20 RX ADMIN — Medication 2 TABLET: at 08:20

## 2023-12-20 NOTE — PLAN OF CARE
Goal Outcome Evaluation:        Problem: Adult Behavioral Health Plan of Care  Goal: Patient-Specific Goal (Individualization)  Outcome: Ongoing, Progressing  Flowsheets  Taken 12/19/2023 1006  Patient-Specific Goals (Include Timeframe): Identify 2-3 coping skills, address relapse prevention methods, complete aftercare plans, and deny SI/HI prior to discharge.  Individualized Care Needs: Therapist to offer 1-4 therapy sessions, aftercare planning, safety planning, family education, group therapy, and brief CBT/MI interventions.  Anxieties, Fears or Concerns: none verbalized  Taken 12/19/2023 0952  Patient Personal Strengths:   resilient   resourceful   family/social support   positive vocational history   positive educational history   stable living environment   motivated for treatment   motivated for recovery   independent living skills   parenting skills   intellectual cognitive skills  Patient Vulnerabilities:   substance abuse/addiction   history of unsuccessful treatment   poor impulse control   traumatic event  Goal: Optimized Coping Skills in Response to Life Stressors  Outcome: Ongoing, Progressing  Flowsheets (Taken 12/19/2023 1006)  Optimized Coping Skills in Response to Life Stressors: making progress toward outcome  Intervention: Promote Effective Coping Strategies  Flowsheets (Taken 12/20/2023 0933)  Supportive Measures:   active listening utilized   counseling provided   decision-making supported   goal-setting facilitated   positive reinforcement provided   self-reflection promoted   self-responsibility promoted   self-care encouraged   relaxation techniques promoted   verbalization of feelings encouraged  Goal: Develops/Participates in Therapeutic [place] to Support Successful Transition  Outcome: Ongoing, Progressing  Flowsheets (Taken 12/19/2023 1006)  Develops/Participates in Therapeutic [place] to Support Successful Transition: making progress toward outcome  Intervention: Foster Therapeutic  [place]  Flowsheets (Taken 12/20/2023 0933)  Trust Relationship/Rapport:   care explained   questions encouraged   choices provided   reassurance provided   emotional support provided   thoughts/feelings acknowledged   empathic listening provided   questions answered  Intervention: Mutually Develop Transition Plan  Flowsheets  Taken 12/20/2023 0933  Transportation Anticipated: car, drives self  Transportation Concerns: none  Current Discharge Risk:   substance use/abuse   psychiatric illness  Concerns to be Addressed:   substance/tobacco abuse/use   coping/stress   cognitive/perceptual   mental health   discharge planning  Readmission Within the Last 30 Days: no previous admission in last 30 days  Patient/Family Anticipated Services at Transition:   mental health services   outpatient care  Patient/Family Anticipates Transition to: home  Offered/Gave Vendor List: yes  Taken 12/19/2023 1006  Outpatient/Agency/Support Group Needs:   outpatient substance abuse treatment (specify)   outpatient psychiatric care (specify)   outpatient medication management   outpatient counseling  Transition Support:   follow-up care discussed   follow-up care coordinated   community resources reviewed   crisis management plan promoted   crisis management plan verbalized  Anticipated Discharge Disposition: home or self-care  Taken 12/19/2023 1005  Discharge Coordination/Progress: Patient does not have insurance coverage. Therapist met with patient to complete assessment.  Patient's Choice of Community Agency(s): to be determined         DATA:  Therapist met with Patient individually this date. Patient agreeable to discuss current treatment progress and discharge concerns.     CLINICAL MANUVERING/INTERVENTIONS:  Assisted Patient in processing session content; acknowledged and normalized Patient’s thoughts, feelings, and concerns by utilizing a person-centered approach in efforts to build appropriate rapport and a positive therapeutic  relationship with open and honest communication. Allowed Patient to ventilate regarding current stressors and triggers for negative emotions and thoughts in a safe nonjudgmental environment with unconditional positive regard, active listening skills, and empathy. Therapist implemented motivational interviewing techniques to assist Patient with exploring personal growth and change and discussed distress tolerance skills, self soothing techniques, and applied cognitive behavioral strategies to facilitate identification of maladaptive patterns of thinking and behavior.Therapist utilized dialectical behavior techniques to teach and model emotional regulation and relaxation methods. Therapist assisted Patient with identifying and implementing healthier coping strategies. Therapist assisted Patient with safety planning; Patient agreed to continue honest communication with Treatment Team while inpatient and identify any SI/HI.  Patient encouraged to seek nearest ER or contact 911 if danger to self or others post discharge.     ASSESSMENT:    Therapist met with patient on this date, continues to receive treatment for detox. Patient reports high anxiety and depression, denies SI/HI/AVH. Patient reports experiencing sweats, palpitations, and fatigue. Patient states she has been really tired because of the medication she has been taking. Patient declines family involvement in treatment. Patient plans to return home at discharge and is agreeable to outpatient services for aftercare. To be scheduled in the [facility]. Patient does not anticipate needing assistance with transportation. Healthy coping skills and relapse prevention reviewed with patient at this time.     PLAN:   Patient will continue stabilization. Patient will continue to receive services offered by Treatment Team.     Therapist recommends [facility]. Patient plans to return home with outpatient services.

## 2023-12-20 NOTE — PLAN OF CARE
Goal Outcome Evaluation:  Plan of Care Reviewed With: patient  Patient Agreement with Plan of Care: agrees     Progress: improving  Outcome Evaluation: Pt less sedated this evening, but still resting. Pt rates anxiety 7/10, depression/craving 10/10 and denies SI/HI/AVH.  Pt appeared to sleep throughout the night.

## 2023-12-20 NOTE — PLAN OF CARE
Goal Outcome Evaluation:  Plan of Care Reviewed With: patient  Patient Agreement with Plan of Care: agrees     Progress: improving  Outcome Evaluation: Pt has been calm and cooperative, resting in room for most of shift. Pt rates anxiety 7, depression 7, states sleep and appetite are good. Pt denies SI/HI/AVH. Pt rates cravings 7, states her withdrawal symptoms are headache, hot and cold chills, and anxiousness.

## 2023-12-20 NOTE — PROGRESS NOTES
"INPATIENT PSYCHIATRIC PROGRESS NOTE    Name:  Gee Morales  :  1980  MRN:  [id number]  Visit Number:  [id number]  Length of stay:  1    SUBJECTIVE    CC/Focus of Exam: alcohol use    INTERVAL HISTORY:  The patient reports she is feeling some satya today but is experiencing ongoing withdrawals.   Depression rating 810  Anxiety rating 810  Sleep: good  Withdrawal sx: anxiety, skin flushing, palpitations, sweating.  Cravin/10    Review of Systems   Constitutional:  Positive for diaphoresis.   Respiratory: Negative.     Cardiovascular: Negative.    Gastrointestinal: Negative.    Psychiatric/Behavioral:  The patient is nervous/anxious.        OBJECTIVE    Temp:  [97.4 °F (36.3 °C)-98.3 °F (36.8 °C)] 97.4 °F (36.3 °C)  Heart Rate:  [] 91  Resp:  [18-20] 20  BP: (128-161)/() 128/83    MENTAL STATUS EXAM:  Appearance:Casually dressed, good hygeine.   Cooperation:Cooperative  Psychomotor: No psychomotor agitation/retardation, No EPS, No motor tics  Speech-normal rate, amount.  Mood \"anxious\"   Affect-congruent, appropriate, stable  Thought Content-goal directed, no delusional material present  Thought process-linear, organized.  Suicidality: No SI  Homicidality: No HI  Perception: No AH/VH  Insight-fair   Judgement-fair    Lab Results (last 24 hours)       Procedure Component Value Units Date/Time    Comprehensive Metabolic Panel [[id number]]  (Abnormal) Collected: 23 0455    Specimen: Blood Updated: 23     Glucose 119 mg/dL      BUN 14 mg/dL      Creatinine 0.75 mg/dL      Sodium 141 mmol/L      Potassium 4.4 mmol/L      Chloride 110 mmol/L      CO2 24.7 mmol/L      Calcium 9.2 mg/dL      Total Protein 6.3 g/dL      Albumin 3.4 g/dL      ALT (SGPT) 16 U/L      AST (SGOT) 24 U/L      Alkaline Phosphatase 78 U/L      Total Bilirubin 0.2 mg/dL      Globulin 2.9 gm/dL      A/G Ratio 1.2 g/dL      BUN/Creatinine Ratio 18.7     Anion Gap 6.3 mmol/L      eGFR 102.7 mL/min/1.73     " Narrative:      GFR Normal >60  Chronic Kidney Disease <60  Kidney Failure <15                 Imaging Results (Last 24 Hours)       ** No results found for the last 24 hours. **               ECG/EMG Results (most recent)       None             ALLERGIES: Reglan [metoclopramide]    Medication Review:   Scheduled Medications:  B-complex with vitamin C, 2 tablet, Oral, Daily  cephalexin, 500 mg, Oral, Q12H  LORazepam, 2 mg, Oral, 3 times per day   Followed by  [START ON 12/21/2023] LORazepam, 1.5 mg, Oral, 3 times per day   Followed by  [START ON 12/22/2023] LORazepam, 1 mg, Oral, 3 times per day   Followed by  [START ON 12/23/2023] LORazepam, 0.5 mg, Oral, 3 times per day  mirtazapine, 15 mg, Oral, Nightly  multivitamin with minerals, 1 tablet, Oral, Daily  nicotine, 1 patch, Transdermal, Q24H  thiamine, 100 mg, Oral, Daily         PRN Medications:    acetaminophen    aluminum-magnesium hydroxide-simethicone    benzonatate    benztropine **OR** benztropine    famotidine    ibuprofen    loperamide    LORazepam **FOLLOWED BY** [START ON 12/21/2023] LORazepam **FOLLOWED BY** [START ON 12/22/2023] LORazepam **FOLLOWED BY** [START ON 12/23/2023] LORazepam    magnesium hydroxide    Potassium Replacement - Follow Nurse / BPA Driven Protocol    sodium chloride    SUMAtriptan   All medications reviewed.    ASSESSMENT & PLAN:      Alcohol use disorder, severe, dependence    Alcohol withdrawal  -Ativan detox  -Thiamine and folate       Depressive disorder unspecified  -Start mirtazapine 15 mg hs       Nicotine use disorder  -Encouraged cessation       Methamphetamine use disorder, mild  -Supportive treatment       Opioid use disorder, moderate, in sustained remission  -Encouraged ongoing sobriety       Hypokalemia and hypocalcemia  -Resolved       QTc prolongation  -Stop hydroxyzine, ondansetron and trazodone  -Correct hypokalemia  -Repeat EKG today       UTI  -Keflex started in the ED    Special precautions: Special  Precautions Level 4 (q30 min checks).    Behavioral Health Treatment Plan and Problem List: I have reviewed and approved the Behavioral Health Treatment Plan and Problem list.  The patient has had a chance to review and agrees with the treatment plan.    Copied text in portions of this note has been reviewed and is accurate as of 12/20/23         Clinician:  Ashlie Love MD  12/20/23  10:08 EST

## 2023-12-21 PROCEDURE — 99232 SBSQ HOSP IP/OBS MODERATE 35: CPT | Performed by: PSYCHIATRY & NEUROLOGY

## 2023-12-21 RX ORDER — NALTREXONE HYDROCHLORIDE 50 MG/1
50 TABLET, FILM COATED ORAL DAILY
Status: DISCONTINUED | OUTPATIENT
Start: 2023-12-21 | End: 2023-12-22 | Stop reason: HOSPADM

## 2023-12-21 RX ADMIN — CEPHALEXIN 500 MG: 500 CAPSULE ORAL at 21:29

## 2023-12-21 RX ADMIN — SUMATRIPTAN SUCCINATE 50 MG: 50 TABLET, FILM COATED ORAL at 21:29

## 2023-12-21 RX ADMIN — Medication 2 TABLET: at 08:00

## 2023-12-21 RX ADMIN — CEPHALEXIN 500 MG: 500 CAPSULE ORAL at 08:25

## 2023-12-21 RX ADMIN — Medication 100 MG: at 08:00

## 2023-12-21 RX ADMIN — NALTREXONE HYDROCHLORIDE 50 MG: 50 TABLET, FILM COATED ORAL at 12:11

## 2023-12-21 RX ADMIN — LORAZEPAM 1.5 MG: 1 TABLET ORAL at 08:01

## 2023-12-21 RX ADMIN — Medication 1 TABLET: at 08:00

## 2023-12-21 RX ADMIN — LORAZEPAM 1.5 MG: 1 TABLET ORAL at 14:12

## 2023-12-21 RX ADMIN — LORAZEPAM 1.5 MG: 1 TABLET ORAL at 21:29

## 2023-12-21 NOTE — PLAN OF CARE
Goal Outcome Evaluation:  Plan of Care Reviewed With: patient  Patient Agreement with Plan of Care: agrees     Progress: improving  Outcome Evaluation: Patient has been cooperative this shift. Patient reports sleep is getting better and good appetite. Patient reports anxiety and depression 8/10. Rates craving 10/10. Patient denies SI/HI/AVH. Pt has had no complaints this shift.

## 2023-12-21 NOTE — PLAN OF CARE
Goal Outcome Evaluation:  Plan of Care Reviewed With: patient              Pt slept well, given additional meds for CIWA , elevated heart rate and tachycardia.Refused group.

## 2023-12-21 NOTE — PLAN OF CARE
Goal Outcome Evaluation:        Problem: Adult Behavioral Health Plan of Care  Goal: Patient-Specific Goal (Individualization)  Outcome: Ongoing, Progressing  Flowsheets  Taken 12/19/2023 1006  Patient-Specific Goals (Include Timeframe): Identify 2-3 coping skills, address relapse prevention methods, complete aftercare plans, and deny SI/HI prior to discharge.  Individualized Care Needs: Therapist to offer 1-4 therapy sessions, aftercare planning, safety planning, family education, group therapy, and brief CBT/MI interventions.  Anxieties, Fears or Concerns: none verbalized  Taken 12/19/2023 0952  Patient Personal Strengths:   resilient   resourceful   family/social support   positive vocational history   positive educational history   stable living environment   motivated for treatment   motivated for recovery   independent living skills   parenting skills   intellectual cognitive skills  Patient Vulnerabilities:   substance abuse/addiction   history of unsuccessful treatment   poor impulse control   traumatic event  Goal: Optimized Coping Skills in Response to Life Stressors  Outcome: Ongoing, Progressing  Flowsheets (Taken 12/19/2023 1006)  Optimized Coping Skills in Response to Life Stressors: making progress toward outcome  Intervention: Promote Effective Coping Strategies  Flowsheets (Taken 12/21/2023 0923)  Supportive Measures:   active listening utilized   counseling provided   decision-making supported   goal-setting facilitated   verbalization of feelings encouraged   self-responsibility promoted   positive reinforcement provided   self-reflection promoted   self-care encouraged  Goal: Develops/Participates in Therapeutic [place] to Support Successful Transition  Outcome: Ongoing, Progressing  Flowsheets (Taken 12/19/2023 1006)  Develops/Participates in Therapeutic [place] to Support Successful Transition: making progress toward outcome  Intervention: Foster Therapeutic [place]  Flowsheets (Taken  12/21/2023 0923)  Trust Relationship/Rapport:   care explained   reassurance provided   choices provided   thoughts/feelings acknowledged   emotional support provided   empathic listening provided   questions answered   questions encouraged  Intervention: Mutually Develop Transition Plan  Flowsheets  Taken 12/21/2023 0922  Discharge Coordination/Progress: Patient is Medicaid pending. Patient plans to return home with outpatient services with the [facility]. Patient denies needing assistance with transportation.  Transportation Anticipated: car, drives self  Transportation Concerns: none  Current Discharge Risk:   substance use/abuse   psychiatric illness  Concerns to be Addressed:   substance/tobacco abuse/use   coping/stress   cognitive/perceptual   mental health   discharge planning  Readmission Within the Last 30 Days: no previous admission in last 30 days  Patient/Family Anticipated Services at Transition:   outpatient care   mental health services  Patient's Choice of Community Agency(s): [facility]  Patient/Family Anticipates Transition to: home  Offered/Gave Vendor List: yes  Taken 12/19/2023 1006  Outpatient/Agency/Support Group Needs:   outpatient substance abuse treatment (specify)   outpatient psychiatric care (specify)   outpatient medication management   outpatient counseling  Transition Support:   follow-up care discussed   follow-up care coordinated   community resources reviewed   crisis management plan promoted   crisis management plan verbalized  Anticipated Discharge Disposition: home or self-care         DATA:  Therapist met with Patient individually this date. Patient agreeable to discuss current treatment progress and discharge concerns.     CLINICAL MANUVERING/INTERVENTIONS:   Assisted Patient in processing session content; acknowledged and normalized Patient’s thoughts, feelings, and concerns by utilizing a person-centered approach in efforts to build appropriate rapport and a  positive therapeutic relationship with open and honest communication. Allowed Patient to ventilate regarding current stressors and triggers for negative emotions and thoughts in a safe nonjudgmental environment with unconditional positive regard, active listening skills, and empathy. Therapist implemented motivational interviewing techniques to assist Patient with exploring personal growth and change and discussed distress tolerance skills, self soothing techniques, and applied cognitive behavioral strategies to facilitate identification of maladaptive patterns of thinking and behavior.Therapist utilized dialectical behavior techniques to teach and model emotional regulation and relaxation methods. Therapist assisted Patient with identifying and implementing healthier coping strategies. Therapist assisted Patient with safety planning; Patient agreed to continue honest communication with Treatment Team while inpatient and identify any SI/HI. Patient encouraged to seek nearest ER or contact 911 if danger to self or others post discharge.     ASSESSMENT:    Patient continues to receive treatment for detox. Patient reports high anxiety and depression, denies SI/HI/AVH. Patient reports experiencing headache, palpitations, and back pain. Continues to be very tired and is spending most of her time sleeping. Patient plans to return home at discharge and has been scheduled at the [facility] for aftercare.     PLAN:   Patient will continue stabilization. Patient will continue to receive services offered by Treatment Team.     Therapist recommends [facility] residential rehab. Patient plans to return home and will follow up with the [facility].

## 2023-12-21 NOTE — PROGRESS NOTES
"INPATIENT PSYCHIATRIC PROGRESS NOTE    Name:  Gee Morales  :  1980  MRN:  [id number]  Visit Number:  [id number]  Length of stay:  2    SUBJECTIVE    CC/Focus of Exam: alcohol use    INTERVAL HISTORY:  The patient reports she is feeling very tired and sleepy today. She states she is stressed out that none of this will work and she is afraid she will never be able to get to sobriety.   Depression rating 8/10  Anxiety rating 810  Sleep: good  Withdrawal sx: headaches, palpitations, back pain  Cravin/10    Review of Systems   Constitutional:  Positive for diaphoresis.   Respiratory: Negative.     Cardiovascular: Negative.    Gastrointestinal: Negative.    Musculoskeletal:  Positive for back pain.   Neurological:  Positive for headaches.   Psychiatric/Behavioral:  The patient is nervous/anxious.        OBJECTIVE    Temp:  [97.3 °F (36.3 °C)-98.6 °F (37 °C)] 97.3 °F (36.3 °C)  Heart Rate:  [] 90  Resp:  [16-20] 16  BP: (115-173)/() 115/83    MENTAL STATUS EXAM:  Appearance:Casually dressed, good hygeine.   Cooperation:Cooperative  Psychomotor: No psychomotor agitation/retardation, No EPS, No motor tics  Speech-normal rate, amount.  Mood \"anxious\"   Affect-congruent, appropriate, stable  Thought Content-goal directed, no delusional material present  Thought process-linear, organized.  Suicidality: No SI  Homicidality: No HI  Perception: No AH/VH  Insight-fair   Judgement-fair    Lab Results (last 24 hours)       ** No results found for the last 24 hours. **               Imaging Results (Last 24 Hours)       ** No results found for the last 24 hours. **               ECG/EMG Results (most recent)       Procedure Component Value Units Date/Time    ECG 12 Lead QT Measurement [[id number]] Collected: 23 1352     Updated: 23 1614     QT Interval 354 ms      QTC Interval 459 ms     Narrative:      Test Reason : QT Measurement  Blood Pressure :   */*   mmHG  Vent. Rate : 101 BPM     Atrial " Rate : 101 BPM     P-R Int : 130 ms          QRS Dur :  88 ms      QT Int : 354 ms       P-R-T Axes :  16 -14   5 degrees     QTc Int : 459 ms    Sinus tachycardia  Otherwise normal ECG  When compared with ECG of 20-DEC-2023 13:52, (Unconfirmed)  No significant change was found  Confirmed by Clemente Stark () on 2023 4:14:08 PM    Referred By: ALFREDO           Confirmed By: Clemente Stark             ALLERGIES: Reglan [metoclopramide]    Medication Review:   Scheduled Medications:  B-complex with vitamin C, 2 tablet, Oral, Daily  cephalexin, 500 mg, Oral, Q12H  LORazepam, 1.5 mg, Oral, 3 times per day   Followed by  [START ON 2023] LORazepam, 1 mg, Oral, 3 times per day   Followed by  [START ON 2023] LORazepam, 0.5 mg, Oral, 3 times per day  mirtazapine, 15 mg, Oral, Nightly  multivitamin with minerals, 1 tablet, Oral, Daily  nicotine, 1 patch, Transdermal, Q24H  thiamine, 100 mg, Oral, Daily         PRN Medications:    acetaminophen    aluminum-magnesium hydroxide-simethicone    benzonatate    benztropine **OR** benztropine    famotidine    ibuprofen    loperamide    [] LORazepam **FOLLOWED BY** LORazepam **FOLLOWED BY** [START ON 2023] LORazepam **FOLLOWED BY** [START ON 2023] LORazepam    magnesium hydroxide    Potassium Replacement - Follow Nurse / BPA Driven Protocol    sodium chloride    SUMAtriptan   All medications reviewed.    ASSESSMENT & PLAN:      Alcohol use disorder, severe, dependence    Alcohol withdrawal  -Ativan detox  -Thiamine and folate  -Start naltrexone 50 mg daily       Depressive disorder unspecified  -Stop mirtazapine 15 mg hs as patient feels it is making her too sleepy       Nicotine use disorder  -Encouraged cessation       Methamphetamine use disorder, mild  -Supportive treatment       Opioid use disorder, moderate, in sustained remission  -Encouraged ongoing sobriety       Hypokalemia and hypocalcemia  -Resolved       QTc prolongation  -Stop  hydroxyzine, ondansetron and trazodone  -Corrected hypokalemia  -Repeat EKG is normal       UTI  -Keflex started in the ED    Special precautions: Special Precautions Level 4 (q30 min checks).    Behavioral Health Treatment Plan and Problem List: I have reviewed and approved the Behavioral Health Treatment Plan and Problem list.  The patient has had a chance to review and agrees with the treatment plan.    Copied text in portions of this note has been reviewed and is accurate as of 12/21/23         Clinician:  Ashlie Love MD  12/21/23  11:03 EST

## 2023-12-21 NOTE — NURSING NOTE
Dr. Chung contacted related to patient having blood pressure that is trending up and CIWA 14.     New orders given:  Clonidine 0.1mg PO Once   VORB x 2

## 2023-12-22 VITALS
BODY MASS INDEX: 27.16 KG/M2 | SYSTOLIC BLOOD PRESSURE: 134 MMHG | HEART RATE: 94 BPM | RESPIRATION RATE: 16 BRPM | WEIGHT: 147.6 LBS | OXYGEN SATURATION: 95 % | TEMPERATURE: 98.2 F | HEIGHT: 62 IN | DIASTOLIC BLOOD PRESSURE: 87 MMHG

## 2023-12-22 PROCEDURE — 99239 HOSP IP/OBS DSCHRG MGMT >30: CPT | Performed by: PSYCHIATRY & NEUROLOGY

## 2023-12-22 RX ORDER — NALTREXONE HYDROCHLORIDE 50 MG/1
50 TABLET, FILM COATED ORAL DAILY
Qty: 30 TABLET | Refills: 0 | Status: SHIPPED | OUTPATIENT
Start: 2023-12-23

## 2023-12-22 RX ORDER — CEPHALEXIN 500 MG/1
500 CAPSULE ORAL EVERY 12 HOURS SCHEDULED
Qty: 7 CAPSULE | Refills: 0 | Status: SHIPPED | OUTPATIENT
Start: 2023-12-22 | End: 2023-12-26

## 2023-12-22 RX ADMIN — CEPHALEXIN 500 MG: 500 CAPSULE ORAL at 08:09

## 2023-12-22 RX ADMIN — Medication 2 TABLET: at 08:10

## 2023-12-22 RX ADMIN — Medication 100 MG: at 08:10

## 2023-12-22 RX ADMIN — Medication 1 TABLET: at 08:10

## 2023-12-22 RX ADMIN — NALTREXONE HYDROCHLORIDE 50 MG: 50 TABLET, FILM COATED ORAL at 08:10

## 2023-12-22 RX ADMIN — LORAZEPAM 1 MG: 1 TABLET ORAL at 08:10

## 2023-12-22 NOTE — DISCHARGE SUMMARY
":  1980  MRN:  [id number]  Visit Number:  [id number]      Date of Admission:2023   Date of Discharge:  2023    Discharge Diagnosis:  Active Problems:    Alcohol use disorder, severe, dependence    Alcohol withdrawal    Depressive disorder unspecified    Nicotine use disorder    Methamphetamine use disorder, mild    Opioid use disorder, moderate, in sustained remission        Admission Diagnosis:  Alcohol abuse [F10.10]     HPI  Gee Morales is a 43 y.o. female who was admitted on 2023 with complaints of alcohol use and withdrawals.   For details please see H&P dated 23.     Hospital Course  Patient is a 43 y.o. female presented with alcohol use and withdrawals. The patient was admitted to the [facility] detox recovery unit for safety, further evaluation and treatment.  The patient was started on Ativan detox and she was able to complete it early. She was interested in naltrexone and was started on oral naltrexone 50 mg daily. Her UA showed signs of a UTI and was treated with Keflex.  The patient was also able to take part in individual and group counseling sessions and work on appropriate coping skills.  The patient made steady improvement in her withdrawals and mood and expressed feeling more positive and hopeful about future. Sleep and appetite were improved.  The day of discharge the patient was calm, cooperative and pleasant. Mood was reported to be good, and denied SI/HI/AVH. Also reported no medication side effects.  .      Mental Status Exam upon discharge:   Mood \"good\"   Affect-congruent, appropriate, stable  Thought Content-goal directed, no delusional material present  Thought process-linear, organized.  Suicidality: No SI  Homicidality: No HI  Perception: No AH/    Procedures Performed         Consults:   Consults       No orders found from 2023 to 2023.            Pertinent Test Results:   Admission on 2023   Component Date Value Ref Range " Status    Hepatitis B Surface Ag 12/19/2023 Non-Reactive  Non-Reactive Final    Hep A IgM 12/19/2023 Non-Reactive  Non-Reactive Final    Hep B C IgM 12/19/2023 Non-Reactive  Non-Reactive Final    Hepatitis C Ab 12/19/2023 Reactive (A)  Non-Reactive Final    Glucose 12/19/2023 120 (H)  65 - 99 mg/dL Final    BUN 12/19/2023 8  6 - 20 mg/dL Final    Creatinine 12/19/2023 0.75  0.57 - 1.00 mg/dL Final    Sodium 12/19/2023 140  136 - 145 mmol/L Final    Potassium 12/19/2023 3.1 (L)  3.5 - 5.2 mmol/L Final    Chloride 12/19/2023 104  98 - 107 mmol/L Final    CO2 12/19/2023 25.3  22.0 - 29.0 mmol/L Final    Calcium 12/19/2023 7.3 (L)  8.6 - 10.5 mg/dL Final    Total Protein 12/19/2023 6.3  6.0 - 8.5 g/dL Final    Albumin 12/19/2023 3.5  3.5 - 5.2 g/dL Final    ALT (SGPT) 12/19/2023 18  1 - 33 U/L Final    AST (SGOT) 12/19/2023 29  1 - 32 U/L Final    Alkaline Phosphatase 12/19/2023 87  39 - 117 U/L Final    Total Bilirubin 12/19/2023 0.5  0.0 - 1.2 mg/dL Final    Globulin 12/19/2023 2.8  gm/dL Final    A/G Ratio 12/19/2023 1.3  g/dL Final    BUN/Creatinine Ratio 12/19/2023 10.7  7.0 - 25.0 Final    Anion Gap 12/19/2023 10.7  5.0 - 15.0 mmol/L Final    eGFR 12/19/2023 102.7  >60.0 mL/min/1.73 Final    Lactate 12/19/2023 1.3  0.5 - 2.0 mmol/L Final    WBC 12/19/2023 9.48  3.40 - 10.80 10*3/mm3 Final    RBC 12/19/2023 3.74 (L)  3.77 - 5.28 10*6/mm3 Final    Hemoglobin 12/19/2023 11.0 (L)  12.0 - 15.9 g/dL Final    Hematocrit 12/19/2023 33.3 (L)  34.0 - 46.6 % Final    MCV 12/19/2023 89.0  79.0 - 97.0 fL Final    MCH 12/19/2023 29.4  26.6 - 33.0 pg Final    MCHC 12/19/2023 33.0  31.5 - 35.7 g/dL Final    RDW 12/19/2023 14.1  12.3 - 15.4 % Final    RDW-SD 12/19/2023 44.3  37.0 - 54.0 fl Final    MPV 12/19/2023 9.2  6.0 - 12.0 fL Final    Platelets 12/19/2023 213  140 - 450 10*3/mm3 Final    Neutrophil % 12/19/2023 66.8  42.7 - 76.0 % Final    Lymphocyte % 12/19/2023 22.9  19.6 - 45.3 % Final    Monocyte % 12/19/2023 7.8  5.0 -  12.0 % Final    Eosinophil % 12/19/2023 1.9  0.3 - 6.2 % Final    Basophil % 12/19/2023 0.4  0.0 - 1.5 % Final    Immature Grans % 12/19/2023 0.2  0.0 - 0.5 % Final    Neutrophils, Absolute 12/19/2023 6.33  1.70 - 7.00 10*3/mm3 Final    Lymphocytes, Absolute 12/19/2023 2.17  0.70 - 3.10 10*3/mm3 Final    Monocytes, Absolute 12/19/2023 0.74  0.10 - 0.90 10*3/mm3 Final    Eosinophils, Absolute 12/19/2023 0.18  0.00 - 0.40 10*3/mm3 Final    Basophils, Absolute 12/19/2023 0.04  0.00 - 0.20 10*3/mm3 Final    Immature Grans, Absolute 12/19/2023 0.02  0.00 - 0.05 10*3/mm3 Final    nRBC 12/19/2023 0.0  0.0 - 0.2 /100 WBC Final    Glucose 12/20/2023 119 (H)  65 - 99 mg/dL Final    BUN 12/20/2023 14  6 - 20 mg/dL Final    Creatinine 12/20/2023 0.75  0.57 - 1.00 mg/dL Final    Sodium 12/20/2023 141  136 - 145 mmol/L Final    Potassium 12/20/2023 4.4  3.5 - 5.2 mmol/L Final    Chloride 12/20/2023 110 (H)  98 - 107 mmol/L Final    CO2 12/20/2023 24.7  22.0 - 29.0 mmol/L Final    Calcium 12/20/2023 9.2  8.6 - 10.5 mg/dL Final    Total Protein 12/20/2023 6.3  6.0 - 8.5 g/dL Final    Albumin 12/20/2023 3.4 (L)  3.5 - 5.2 g/dL Final    ALT (SGPT) 12/20/2023 16  1 - 33 U/L Final    AST (SGOT) 12/20/2023 24  1 - 32 U/L Final    Alkaline Phosphatase 12/20/2023 78  39 - 117 U/L Final    Total Bilirubin 12/20/2023 0.2  0.0 - 1.2 mg/dL Final    Globulin 12/20/2023 2.9  gm/dL Final    A/G Ratio 12/20/2023 1.2  g/dL Final    BUN/Creatinine Ratio 12/20/2023 18.7  7.0 - 25.0 Final    Anion Gap 12/20/2023 6.3  5.0 - 15.0 mmol/L Final    eGFR 12/20/2023 102.7  >60.0 mL/min/1.73 Final    QT Interval 12/20/2023 354  ms Final    QTC Interval 12/20/2023 459  ms Final   Admission on 12/18/2023, Discharged on 12/19/2023   Component Date Value Ref Range Status    Glucose 12/18/2023 171 (H)  65 - 99 mg/dL Final    BUN 12/18/2023 7  6 - 20 mg/dL Final    Creatinine 12/18/2023 0.76  0.57 - 1.00 mg/dL Final    Sodium 12/18/2023 137  136 - 145 mmol/L Final     Potassium 12/18/2023 2.9 (L)  3.5 - 5.2 mmol/L Final    Chloride 12/18/2023 99  98 - 107 mmol/L Final    CO2 12/18/2023 23.1  22.0 - 29.0 mmol/L Final    Calcium 12/18/2023 8.5 (L)  8.6 - 10.5 mg/dL Final    Total Protein 12/18/2023 7.5  6.0 - 8.5 g/dL Final    Albumin 12/18/2023 4.1  3.5 - 5.2 g/dL Final    ALT (SGPT) 12/18/2023 26  1 - 33 U/L Final    AST (SGOT) 12/18/2023 39 (H)  1 - 32 U/L Final    Alkaline Phosphatase 12/18/2023 97  39 - 117 U/L Final    Total Bilirubin 12/18/2023 0.4  0.0 - 1.2 mg/dL Final    Globulin 12/18/2023 3.4  gm/dL Final    A/G Ratio 12/18/2023 1.2  g/dL Final    BUN/Creatinine Ratio 12/18/2023 9.2  7.0 - 25.0 Final    Anion Gap 12/18/2023 14.9  5.0 - 15.0 mmol/L Final    eGFR 12/18/2023 101.1  >60.0 mL/min/1.73 Final    THC, Screen, Urine 12/18/2023 Negative  Negative Final    Phencyclidine (PCP), Urine 12/18/2023 Negative  Negative Final    Cocaine Screen, Urine 12/18/2023 Negative  Negative Final    Methamphetamine, Ur 12/18/2023 Positive (A)  Negative Final    Opiate Screen 12/18/2023 Negative  Negative Final    Amphetamine Screen, Urine 12/18/2023 Positive (A)  Negative Final    Benzodiazepine Screen, Urine 12/18/2023 Negative  Negative Final    Tricyclic Antidepressants Screen 12/18/2023 Negative  Negative Final    Methadone Screen, Urine 12/18/2023 Negative  Negative Final    Barbiturates Screen, Urine 12/18/2023 Negative  Negative Final    Oxycodone Screen, Urine 12/18/2023 Negative  Negative Final    Buprenorphine, Screen, Urine 12/18/2023 Negative  Negative Final    Ethanol 12/18/2023 168 (H)  0 - 10 mg/dL Final    Ethanol % 12/18/2023 0.168  % Final    TSH 12/18/2023 1.640  0.270 - 4.200 uIU/mL Final    Magnesium 12/18/2023 1.7  1.6 - 2.6 mg/dL Final    QT Interval 12/18/2023 348  ms Final    QTC Interval 12/18/2023 537  ms Final    HCG, Urine QL 12/18/2023 Negative  Negative Final    Color, UA 12/18/2023 Orange (A)  Yellow, Straw Final    Appearance, UA 12/18/2023 Turbid  (A)  Clear Final    pH, UA 12/18/2023 6.0  5.0 - 8.0 Final    Specific [place], UA 12/18/2023 1.012  1.005 - 1.030 Final    Glucose, UA 12/18/2023 Negative  Negative Final    Ketones, UA 12/18/2023 Negative  Negative Final    Bilirubin, UA 12/18/2023 Small (1+) (A)  Negative Final    Blood, UA 12/18/2023 Trace (A)  Negative Final    Protein, UA 12/18/2023 100 mg/dL (2+) (A)  Negative Final    Leuk Esterase, UA 12/18/2023 Moderate (2+) (A)  Negative Final    Nitrite, UA 12/18/2023 Positive (A)  Negative Final    Urobilinogen, UA 12/18/2023 1.0 E.U./dL  0.2 - 1.0 E.U./dL Final    WBC 12/18/2023 11.28 (H)  3.40 - 10.80 10*3/mm3 Final    RBC 12/18/2023 4.18  3.77 - 5.28 10*6/mm3 Final    Hemoglobin 12/18/2023 12.2  12.0 - 15.9 g/dL Final    Hematocrit 12/18/2023 37.1  34.0 - 46.6 % Final    MCV 12/18/2023 88.8  79.0 - 97.0 fL Final    MCH 12/18/2023 29.2  26.6 - 33.0 pg Final    MCHC 12/18/2023 32.9  31.5 - 35.7 g/dL Final    RDW 12/18/2023 13.9  12.3 - 15.4 % Final    RDW-SD 12/18/2023 44.1  37.0 - 54.0 fl Final    MPV 12/18/2023 9.0  6.0 - 12.0 fL Final    Platelets 12/18/2023 249  140 - 450 10*3/mm3 Final    Neutrophil % 12/18/2023 76.1 (H)  42.7 - 76.0 % Final    Lymphocyte % 12/18/2023 16.8 (L)  19.6 - 45.3 % Final    Monocyte % 12/18/2023 5.7  5.0 - 12.0 % Final    Eosinophil % 12/18/2023 0.6  0.3 - 6.2 % Final    Basophil % 12/18/2023 0.4  0.0 - 1.5 % Final    Immature Grans % 12/18/2023 0.4  0.0 - 0.5 % Final    Neutrophils, Absolute 12/18/2023 8.59 (H)  1.70 - 7.00 10*3/mm3 Final    Lymphocytes, Absolute 12/18/2023 1.89  0.70 - 3.10 10*3/mm3 Final    Monocytes, Absolute 12/18/2023 0.64  0.10 - 0.90 10*3/mm3 Final    Eosinophils, Absolute 12/18/2023 0.07  0.00 - 0.40 10*3/mm3 Final    Basophils, Absolute 12/18/2023 0.05  0.00 - 0.20 10*3/mm3 Final    Immature Grans, Absolute 12/18/2023 0.04  0.00 - 0.05 10*3/mm3 Final    nRBC 12/18/2023 0.0  0.0 - 0.2 /100 WBC Final    Extra Tube 12/18/2023 Hold for add-ons.    Final    Auto resulted.    Extra Tube 12/18/2023 hold for add-on   Final    Auto resulted    Extra Tube 12/18/2023 Hold for add-ons.   Final    Auto resulted.    Extra Tube 12/18/2023 Hold for add-ons.   Final    Auto resulted    Fentanyl, Urine 12/18/2023 Negative  Negative Final    RBC, UA 12/18/2023 0-2  None Seen, 0-2 /HPF Final    WBC, UA 12/18/2023 6-10 (A)  None Seen, 0-2 /HPF Final    Bacteria, UA 12/18/2023 3+ (A)  None Seen /HPF Final    Squamous Epithelial Cells, UA 12/18/2023 0-2  None Seen, 0-2 /HPF Final    Hyaline Casts, UA 12/18/2023 0-2  None Seen /LPF Final    Methodology 12/18/2023 Manual Light Microscopy   Final    Extra Tube 12/18/2023 Hold for add-ons.   Final    Auto resulted.    Blood Culture 12/18/2023 No growth at 3 days   Preliminary    Blood Culture 12/18/2023 No growth at 3 days   Preliminary    Lactate 12/18/2023 2.8 (C)  0.5 - 2.0 mmol/L Final    Procalcitonin 12/18/2023 0.03  0.00 - 0.25 ng/mL Final    Sed Rate 12/18/2023 24 (H)  0 - 20 mm/hr Final    C-Reactive Protein 12/18/2023 0.80 (H)  0.00 - 0.50 mg/dL Final    Urine Culture 12/18/2023 50,000 CFU/mL Escherichia coli (A)   Final    Ethanol 12/18/2023 126 (H)  0 - 10 mg/dL Final    Ethanol % 12/18/2023 0.126  % Final    Lactate 12/18/2023 2.9 (C)  0.5 - 2.0 mmol/L Final    Ethanol 12/18/2023 83 (H)  0 - 10 mg/dL Final    Ethanol % 12/18/2023 0.083  % Final        Condition on Discharge:  improved    Vital Signs  Temp:  [97 °F (36.1 °C)-98.2 °F (36.8 °C)] 98.2 °F (36.8 °C)  Heart Rate:  [] 94  Resp:  [16-18] 16  BP: (123-138)/() 134/87      Discharge Disposition:  Home or Self Care    Discharge Medications:     Discharge Medications        New Medications        Instructions Start Date   cephalexin 500 MG capsule  Commonly known as: KEFLEX   500 mg, Oral, Every 12 Hours Scheduled      naltrexone 50 MG tablet  Commonly known as: DEPADE   50 mg, Oral, Daily   Start Date: December 23, 2023            Continue These  Medications        Instructions Start Date   SUMAtriptan 50 MG tablet  Commonly known as: IMITREX   50 mg, Oral, Every 2 Hours PRN, Take one tablet at onset of headache. May repeat dose one time in 2 hours if headache not relieved.               Discharge Diet: Regular     Activity at Discharge: As tolerated     Follow-up Appointments  Future Appointments   Date Time Provider Department Center   12/27/2023  1:30 PM Trinidad Mccord, [facility]         Time: I spent  > 30  minutes on this discharge activity which included: face-to-face encounter with the patient, reviewing the data in the system, coordination of the care with the nursing staff as well as consultants, documentation, and entering orders.        Clinician:   Ashlie Love MD  12/22/23  11:36 EST

## 2023-12-22 NOTE — PROGRESS NOTES
"INPATIENT PSYCHIATRIC PROGRESS NOTE    Name:  Gee Morales  :  1980  MRN:  [id number]  Visit Number:  [id number]  Length of stay:  3    SUBJECTIVE    CC/Focus of Exam: alcohol use    INTERVAL HISTORY:  The patient reports she is feeling good today and is not experiencing any cravings or withdrawals today. She feels she is ready to go home today.   Depression rating 3/10  Anxiety rating 3/10  Sleep: good  Withdrawal sx: none  Cravin/10    Review of Systems   Respiratory: Negative.     Cardiovascular: Negative.    Gastrointestinal: Negative.        OBJECTIVE    Temp:  [97 °F (36.1 °C)-98.2 °F (36.8 °C)] 98.2 °F (36.8 °C)  Heart Rate:  [] 94  Resp:  [16-18] 16  BP: (123-138)/() 134/87    MENTAL STATUS EXAM:  Appearance:Casually dressed, good hygeine.   Cooperation:Cooperative  Psychomotor: No psychomotor agitation/retardation, No EPS, No motor tics  Speech-normal rate, amount.  Mood \"anxious\"   Affect-congruent, appropriate, stable  Thought Content-goal directed, no delusional material present  Thought process-linear, organized.  Suicidality: No SI  Homicidality: No HI  Perception: No AH/VH  Insight-fair   Judgement-fair    Lab Results (last 24 hours)       ** No results found for the last 24 hours. **               Imaging Results (Last 24 Hours)       ** No results found for the last 24 hours. **               ECG/EMG Results (most recent)       Procedure Component Value Units Date/Time    ECG 12 Lead QT Measurement [[id number]] Collected: 23 1352     Updated: 23 1614     QT Interval 354 ms      QTC Interval 459 ms     Narrative:      Test Reason : QT Measurement  Blood Pressure :   */*   mmHG  Vent. Rate : 101 BPM     Atrial Rate : 101 BPM     P-R Int : 130 ms          QRS Dur :  88 ms      QT Int : 354 ms       P-R-T Axes :  16 -14   5 degrees     QTc Int : 459 ms    Sinus tachycardia  Otherwise normal ECG  When compared with ECG of 20-DEC-2023 13:52, (Unconfirmed)  No " significant change was found  Confirmed by Clemente Stark () on 2023 4:14:08 PM    Referred By: ALFREDO           Confirmed By: Clemente Stark             ALLERGIES: Reglan [metoclopramide]    Medication Review:   Scheduled Medications:  B-complex with vitamin C, 2 tablet, Oral, Daily  cephalexin, 500 mg, Oral, Q12H  LORazepam, 1 mg, Oral, 3 times per day   Followed by  [START ON 2023] LORazepam, 0.5 mg, Oral, 3 times per day  multivitamin with minerals, 1 tablet, Oral, Daily  naltrexone, 50 mg, Oral, Daily  nicotine, 1 patch, Transdermal, Q24H  thiamine, 100 mg, Oral, Daily         PRN Medications:    acetaminophen    aluminum-magnesium hydroxide-simethicone    benzonatate    benztropine **OR** benztropine    famotidine    ibuprofen    loperamide    [] LORazepam **FOLLOWED BY** [] LORazepam **FOLLOWED BY** LORazepam **FOLLOWED BY** [START ON 2023] LORazepam    magnesium hydroxide    Potassium Replacement - Follow Nurse / BPA Driven Protocol    sodium chloride    SUMAtriptan   All medications reviewed.    ASSESSMENT & PLAN:      Alcohol use disorder, severe, dependence    Alcohol withdrawal  -Ativan detox  -Thiamine and folate  -Started naltrexone 50 mg daily       Depressive disorder unspecified  -Stopped mirtazapine 15 mg hs as patient feels it is making her too sleepy       Nicotine use disorder  -Encouraged cessation       Methamphetamine use disorder, mild  -Supportive treatment       Opioid use disorder, moderate, in sustained remission  -Encouraged ongoing sobriety       Hypokalemia and hypocalcemia  -Resolved       QTc prolongation  -Stop hydroxyzine, ondansetron and trazodone  -Corrected hypokalemia  -Repeat EKG is normal       UTI  -Keflex started in the ED    Special precautions: Special Precautions Level 4 (q30 min checks).    Behavioral Health Treatment Plan and Problem List: I have reviewed and approved the Behavioral Health Treatment Plan and Problem list.  The patient  has had a chance to review and agrees with the treatment plan.    Copied text in portions of this note has been reviewed and is accurate as of 12/22/23         Clinician:  Ashlie Love MD  12/22/23  11:33 EST

## 2023-12-22 NOTE — PLAN OF CARE
Goal Outcome Evaluation:        Problem: Adult Behavioral Health Plan of Care  Goal: Patient-Specific Goal (Individualization)  Outcome: Ongoing, Progressing  Flowsheets  Taken 12/19/2023 1006  Patient-Specific Goals (Include Timeframe): Identify 2-3 coping skills, address relapse prevention methods, complete aftercare plans, and deny SI/HI prior to discharge.  Individualized Care Needs: Therapist to offer 1-4 therapy sessions, aftercare planning, safety planning, family education, group therapy, and brief CBT/MI interventions.  Anxieties, Fears or Concerns: none verbalized  Taken 12/19/2023 0955  Patient Personal Strengths:   resilient   resourceful   family/social support   positive vocational history   positive educational history   stable living environment   motivated for treatment   motivated for recovery   independent living skills   parenting skills   intellectual cognitive skills  Patient Vulnerabilities:   substance abuse/addiction   history of unsuccessful treatment   poor impulse control   traumatic event  Goal: Optimized Coping Skills in Response to Life Stressors  Outcome: Ongoing, Progressing  Flowsheets (Taken 12/19/2023 1006)  Optimized Coping Skills in Response to Life Stressors: making progress toward outcome  Intervention: Promote Effective Coping Strategies  Flowsheets (Taken 12/22/2023 1121)  Supportive Measures:   active listening utilized   counseling provided   decision-making supported   goal-setting facilitated   self-reflection promoted   self-care encouraged   positive reinforcement provided   self-responsibility promoted   verbalization of feelings encouraged  Goal: Develops/Participates in Therapeutic Raven to Support Successful Transition  Outcome: Ongoing, Progressing  Flowsheets (Taken 12/19/2023 1006)  Develops/Participates in Therapeutic Raven to Support Successful Transition: making progress toward outcome  Intervention: Foster Therapeutic Raven  Flowsheets (Taken  12/22/2023 1121)  Trust Relationship/Rapport:   care explained   reassurance provided   choices provided   thoughts/feelings acknowledged   emotional support provided   empathic listening provided   questions answered   questions encouraged  Intervention: Mutually Develop Transition Plan  Flowsheets  Taken 12/22/2023 1121  Transportation Anticipated: car, drives self  Transportation Concerns: none  Current Discharge Risk:   substance use/abuse   psychiatric illness  Concerns to be Addressed:   substance/tobacco abuse/use   coping/stress   cognitive/perceptual   mental health   discharge planning  Readmission Within the Last 30 Days: no previous admission in last 30 days  Patient/Family Anticipated Services at Transition:   outpatient care   mental health services  Patient's Choice of Community Agency(s): [facility]  Patient/Family Anticipates Transition to: home  Offered/Gave Vendor List: yes  Taken 12/21/2023 0922  Discharge Coordination/Progress: Patient is Medicaid pending. Patient plans to return home with outpatient services with the [facility]. Patient denies needing assistance with transportation.  Taken 12/19/2023 1006  Outpatient/Agency/Support Group Needs:   outpatient substance abuse treatment (specify)   outpatient psychiatric care (specify)   outpatient medication management   outpatient counseling  Transition Support:   follow-up care discussed   follow-up care coordinated   community resources reviewed   crisis management plan promoted   crisis management plan verbalized  Anticipated Discharge Disposition: home or self-care    Patient is being discharged home on this date. Reports improvement in mood and withdrawal symptoms, denies SI/HI/AVH. Patient is scheduled to follow up outpatient at the [facility]. She has not been agreeable to family involvement in treatment. Patient denies need for assistance with transportation. Healthy coping skills and relapse prevention have been discussed.  Assisted patient with identifying risk factors which would indicate the need for higher level of care including thoughts to harm self or others and/or self-harming behavior. Encouraged patient to call [phone] or present to the nearest emergency room should any of these events occur.     JUAN MANUEL Herrera

## 2023-12-22 NOTE — PLAN OF CARE
Goal Outcome Evaluation:  Plan of Care Reviewed With: patient  Patient Agreement with Plan of Care: agrees     Progress: improving     Pt slept well and appetite is good. Reports feeling fatigued  and very anxious with ability to maintain sobriety. Rates anxiety 8/10 and depression 8/10.

## 2023-12-23 LAB
BACTERIA SPEC AEROBE CULT: NORMAL
BACTERIA SPEC AEROBE CULT: NORMAL

## 2024-02-22 ENCOUNTER — HOSPITAL ENCOUNTER (EMERGENCY)
Facility: HOSPITAL | Age: 44
Discharge: HOME | End: 2024-02-23
Attending: STUDENT IN AN ORGANIZED HEALTH CARE EDUCATION/TRAINING PROGRAM
Payer: MEDICAID

## 2024-02-22 DIAGNOSIS — F10.10 ALCOHOL ABUSE: Primary | ICD-10-CM

## 2024-02-22 LAB
ALBUMIN SERPL-MCNC: 4.1 G/DL (ref 3.5–5.2)
ALBUMIN/GLOB SERPL: 1.1 G/DL
ALP SERPL-CCNC: 101 U/L (ref 39–117)
ALT SERPL W P-5'-P-CCNC: 18 U/L (ref 1–33)
AMPHET+METHAMPHET UR QL: NEGATIVE
AMPHETAMINES UR QL: NEGATIVE
ANION GAP SERPL CALCULATED.3IONS-SCNC: 15.1 MMOL/L (ref 5–15)
AST SERPL-CCNC: 30 U/L (ref 1–32)
BACTERIA UR QL AUTO: ABNORMAL /HPF
BARBITURATES UR QL SCN: NEGATIVE
BASOPHILS # BLD AUTO: 0.09 10*3/MM3 (ref 0–0.2)
BASOPHILS NFR BLD AUTO: 1.1 % (ref 0–1.5)
BENZODIAZ UR QL SCN: NEGATIVE
BILIRUB SERPL-MCNC: 0.3 MG/DL (ref 0–1.2)
BILIRUB UR QL STRIP: NEGATIVE
BUN SERPL-MCNC: 8 MG/DL (ref 6–20)
BUN/CREAT SERPL: 12.9 (ref 7–25)
BUPRENORPHINE SERPL-MCNC: NEGATIVE NG/ML
CALCIUM SPEC-SCNC: 8.6 MG/DL (ref 8.6–10.5)
CANNABINOIDS SERPL QL: NEGATIVE
CHLORIDE SERPL-SCNC: 103 MMOL/L (ref 98–107)
CLARITY UR: CLEAR
CLUMPED PLATELETS: NORMAL
CO2 SERPL-SCNC: 22.9 MMOL/L (ref 22–29)
COCAINE UR QL: NEGATIVE
COLOR UR: YELLOW
CREAT SERPL-MCNC: 0.62 MG/DL (ref 0.57–1)
DEPRECATED RDW RBC AUTO: 44.3 FL (ref 37–54)
EGFRCR SERPLBLD CKD-EPI 2021: 114.9 ML/MIN/1.73
EOSINOPHIL # BLD AUTO: 0.36 10*3/MM3 (ref 0–0.4)
EOSINOPHIL NFR BLD AUTO: 4.4 % (ref 0.3–6.2)
ERYTHROCYTE [DISTWIDTH] IN BLOOD BY AUTOMATED COUNT: 14.5 % (ref 12.3–15.4)
ETHANOL BLD-MCNC: 292 MG/DL (ref 0–10)
ETHANOL UR QL: 0.29 %
FENTANYL UR-MCNC: NEGATIVE NG/ML
GLOBULIN UR ELPH-MCNC: 3.7 GM/DL
GLUCOSE SERPL-MCNC: 122 MG/DL (ref 65–99)
GLUCOSE UR STRIP-MCNC: NEGATIVE MG/DL
HCG SERPL QL: NEGATIVE
HCT VFR BLD AUTO: 41.3 % (ref 34–46.6)
HGB BLD-MCNC: 13.9 G/DL (ref 12–15.9)
HGB UR QL STRIP.AUTO: ABNORMAL
HYALINE CASTS UR QL AUTO: ABNORMAL /LPF
IMM GRANULOCYTES # BLD AUTO: 0.02 10*3/MM3 (ref 0–0.05)
IMM GRANULOCYTES NFR BLD AUTO: 0.2 % (ref 0–0.5)
KETONES UR QL STRIP: NEGATIVE
LEUKOCYTE ESTERASE UR QL STRIP.AUTO: NEGATIVE
LYMPHOCYTES # BLD AUTO: 3 10*3/MM3 (ref 0.7–3.1)
LYMPHOCYTES NFR BLD AUTO: 36.9 % (ref 19.6–45.3)
MAGNESIUM SERPL-MCNC: 2 MG/DL (ref 1.6–2.6)
MCH RBC QN AUTO: 28.4 PG (ref 26.6–33)
MCHC RBC AUTO-ENTMCNC: 33.7 G/DL (ref 31.5–35.7)
MCV RBC AUTO: 84.3 FL (ref 79–97)
METHADONE UR QL SCN: NEGATIVE
MONOCYTES # BLD AUTO: 0.43 10*3/MM3 (ref 0.1–0.9)
MONOCYTES NFR BLD AUTO: 5.3 % (ref 5–12)
NEUTROPHILS NFR BLD AUTO: 4.24 10*3/MM3 (ref 1.7–7)
NEUTROPHILS NFR BLD AUTO: 52.1 % (ref 42.7–76)
NITRITE UR QL STRIP: NEGATIVE
NRBC BLD AUTO-RTO: 0 /100 WBC (ref 0–0.2)
OPIATES UR QL: NEGATIVE
OXYCODONE UR QL SCN: NEGATIVE
PCP UR QL SCN: NEGATIVE
PH UR STRIP.AUTO: 7 [PH] (ref 5–8)
PLAT MORPH BLD: NORMAL
PLATELET # BLD AUTO: 182 10*3/MM3 (ref 140–450)
PMV BLD AUTO: 10.8 FL (ref 6–12)
POTASSIUM SERPL-SCNC: 3.2 MMOL/L (ref 3.5–5.2)
PROT SERPL-MCNC: 7.8 G/DL (ref 6–8.5)
PROT UR QL STRIP: NEGATIVE
RBC # BLD AUTO: 4.9 10*6/MM3 (ref 3.77–5.28)
RBC # UR STRIP: ABNORMAL /HPF
RBC MORPH BLD: NORMAL
REF LAB TEST METHOD: ABNORMAL
SODIUM SERPL-SCNC: 141 MMOL/L (ref 136–145)
SP GR UR STRIP: 1.01 (ref 1–1.03)
SQUAMOUS #/AREA URNS HPF: ABNORMAL /HPF
TRICYCLICS UR QL SCN: NEGATIVE
UROBILINOGEN UR QL STRIP: ABNORMAL
WBC # UR STRIP: ABNORMAL /HPF
WBC NRBC COR # BLD AUTO: 8.14 10*3/MM3 (ref 3.4–10.8)

## 2024-02-22 PROCEDURE — 25010000002 THIAMINE PER 100 MG: Performed by: PHYSICIAN ASSISTANT

## 2024-02-22 PROCEDURE — 96375 TX/PRO/DX INJ NEW DRUG ADDON: CPT

## 2024-02-22 PROCEDURE — 96365 THER/PROPH/DIAG IV INF INIT: CPT

## 2024-02-22 PROCEDURE — 36415 COLL VENOUS BLD VENIPUNCTURE: CPT

## 2024-02-22 PROCEDURE — 84703 CHORIONIC GONADOTROPIN ASSAY: CPT | Performed by: PHYSICIAN ASSISTANT

## 2024-02-22 PROCEDURE — 85007 BL SMEAR W/DIFF WBC COUNT: CPT | Performed by: PHYSICIAN ASSISTANT

## 2024-02-22 PROCEDURE — 96376 TX/PRO/DX INJ SAME DRUG ADON: CPT

## 2024-02-22 PROCEDURE — 80307 DRUG TEST PRSMV CHEM ANLYZR: CPT | Performed by: PHYSICIAN ASSISTANT

## 2024-02-22 PROCEDURE — 80053 COMPREHEN METABOLIC PANEL: CPT | Performed by: PHYSICIAN ASSISTANT

## 2024-02-22 PROCEDURE — 82077 ASSAY SPEC XCP UR&BREATH IA: CPT | Performed by: PHYSICIAN ASSISTANT

## 2024-02-22 PROCEDURE — 25810000003 SODIUM CHLORIDE 0.9 % SOLUTION: Performed by: PHYSICIAN ASSISTANT

## 2024-02-22 PROCEDURE — 99284 EMERGENCY DEPT VISIT MOD MDM: CPT

## 2024-02-22 PROCEDURE — 81001 URINALYSIS AUTO W/SCOPE: CPT | Performed by: PHYSICIAN ASSISTANT

## 2024-02-22 PROCEDURE — 25010000002 THIAMINE HCL 200 MG/2ML SOLUTION: Performed by: EMERGENCY MEDICINE

## 2024-02-22 PROCEDURE — 83735 ASSAY OF MAGNESIUM: CPT | Performed by: PHYSICIAN ASSISTANT

## 2024-02-22 PROCEDURE — 85025 COMPLETE CBC W/AUTO DIFF WBC: CPT | Performed by: PHYSICIAN ASSISTANT

## 2024-02-22 RX ORDER — LORAZEPAM 1 MG/1
1 TABLET ORAL
Status: DISCONTINUED | OUTPATIENT
Start: 2024-02-22 | End: 2024-02-23 | Stop reason: HOSPADM

## 2024-02-22 RX ORDER — FOLIC ACID 1 MG/1
1 TABLET ORAL DAILY
Status: DISCONTINUED | OUTPATIENT
Start: 2024-02-23 | End: 2024-02-23 | Stop reason: HOSPADM

## 2024-02-22 RX ORDER — POTASSIUM CHLORIDE 20 MEQ/1
40 TABLET, EXTENDED RELEASE ORAL ONCE
Status: COMPLETED | OUTPATIENT
Start: 2024-02-22 | End: 2024-02-22

## 2024-02-22 RX ORDER — LORAZEPAM 2 MG/ML
2 INJECTION INTRAMUSCULAR
Status: DISCONTINUED | OUTPATIENT
Start: 2024-02-22 | End: 2024-02-23 | Stop reason: HOSPADM

## 2024-02-22 RX ORDER — SODIUM CHLORIDE 9 MG/ML
1000 INJECTION, SOLUTION INTRAVENOUS ONCE
Status: COMPLETED | OUTPATIENT
Start: 2024-02-22 | End: 2024-02-22

## 2024-02-22 RX ORDER — LORAZEPAM 2 MG/1
2 TABLET ORAL
Status: DISCONTINUED | OUTPATIENT
Start: 2024-02-22 | End: 2024-02-23 | Stop reason: HOSPADM

## 2024-02-22 RX ORDER — LORAZEPAM 2 MG/ML
1 INJECTION INTRAMUSCULAR
Status: DISCONTINUED | OUTPATIENT
Start: 2024-02-22 | End: 2024-02-23 | Stop reason: HOSPADM

## 2024-02-22 RX ORDER — THIAMINE HYDROCHLORIDE 100 MG/ML
200 INJECTION, SOLUTION INTRAMUSCULAR; INTRAVENOUS ONCE
Status: COMPLETED | OUTPATIENT
Start: 2024-02-22 | End: 2024-02-22

## 2024-02-22 RX ORDER — LORAZEPAM 1 MG/1
1 TABLET ORAL EVERY 6 HOURS
Qty: 4 TABLET | Refills: 0 | Status: DISCONTINUED | OUTPATIENT
Start: 2024-02-23 | End: 2024-02-23 | Stop reason: HOSPADM

## 2024-02-22 RX ORDER — SODIUM CHLORIDE 0.9 % (FLUSH) 0.9 %
10 SYRINGE (ML) INJECTION AS NEEDED
Status: DISCONTINUED | OUTPATIENT
Start: 2024-02-22 | End: 2024-02-23 | Stop reason: HOSPADM

## 2024-02-22 RX ORDER — THIAMINE HYDROCHLORIDE 100 MG/ML
200 INJECTION, SOLUTION INTRAMUSCULAR; INTRAVENOUS EVERY 8 HOURS SCHEDULED
Qty: 30 ML | Refills: 0 | Status: DISCONTINUED | OUTPATIENT
Start: 2024-02-22 | End: 2024-02-23 | Stop reason: HOSPADM

## 2024-02-22 RX ORDER — METHION/INOS/CHOL BT/B COM/LIV 110MG-86MG
100 CAPSULE ORAL DAILY
Status: DISCONTINUED | OUTPATIENT
Start: 2024-02-28 | End: 2024-02-23 | Stop reason: HOSPADM

## 2024-02-22 RX ORDER — LORAZEPAM 2 MG/1
2 TABLET ORAL EVERY 6 HOURS
Qty: 4 TABLET | Refills: 0 | Status: DISCONTINUED | OUTPATIENT
Start: 2024-02-22 | End: 2024-02-23 | Stop reason: HOSPADM

## 2024-02-22 RX ADMIN — THIAMINE HYDROCHLORIDE 200 MG: 100 INJECTION, SOLUTION INTRAMUSCULAR; INTRAVENOUS at 22:35

## 2024-02-22 RX ADMIN — SODIUM CHLORIDE 1000 ML: 9 INJECTION, SOLUTION INTRAVENOUS at 17:45

## 2024-02-22 RX ADMIN — THIAMINE HYDROCHLORIDE 200 MG: 100 INJECTION, SOLUTION INTRAMUSCULAR; INTRAVENOUS at 17:45

## 2024-02-22 RX ADMIN — LORAZEPAM 2 MG: 2 TABLET ORAL at 22:18

## 2024-02-22 RX ADMIN — POTASSIUM CHLORIDE 40 MEQ: 1500 TABLET, EXTENDED RELEASE ORAL at 19:04

## 2024-02-22 RX ADMIN — SODIUM CHLORIDE 1000 ML: 9 INJECTION, SOLUTION INTRAVENOUS at 15:52

## 2024-02-22 RX ADMIN — FOLIC ACID 1 MG: 5 INJECTION, SOLUTION INTRAMUSCULAR; INTRAVENOUS; SUBCUTANEOUS at 17:45

## 2024-02-22 NOTE — ED NOTES
Pt was seen walking out of the ER but was located in her car by security and returned to treatment area in the hallway bed. Pt IV is intact. Pt states she was overwhelmed and needed fresh air for a moment. Pt has been educated on [facility] policy of leaving the treatment area with IV in. Pt IS hypertensive and tachycardic. Provider is in a critical emergency at this time. Will notify when provider clears.

## 2024-02-22 NOTE — ED NOTES
2 IV attempts made, unsuccessfully. Another RN to attempt. Will attempt ultrasound guided IV if needed

## 2024-02-22 NOTE — ED PROVIDER NOTES
Subjective   History of Present Illness  43-year-old female who presents to the emergency department chief complaint alcohol abuse.  Patient has history of depression, PTSD.  States she drinks approximately half gallon of vodka daily.  Patient denies any suicidal ideation.    History provided by:  Patient   used: No    Alcohol Intoxication  Location:  Alcohol abuse  Severity:  Moderate  Onset quality:  Gradual  Duration:  1 day  Timing:  Intermittent  Progression:  Worsening  Chronicity:  New  Associated symptoms: no congestion, no cough, no diarrhea, no fever, no headaches, no myalgias, no nausea, no rash, no rhinorrhea, no shortness of breath, no sore throat and no vomiting        Review of Systems   Constitutional: Negative.  Negative for fever.   HENT:  Negative for congestion, rhinorrhea and sore throat.    Eyes: Negative.  Negative for pain and itching.   Respiratory:  Negative for cough and shortness of breath.    Cardiovascular: Negative.    Gastrointestinal: Negative.  Negative for diarrhea, nausea and vomiting.   Endocrine: Negative.  Negative for heat intolerance and polydipsia.   Genitourinary: Negative.    Musculoskeletal: Negative.  Negative for myalgias.   Skin: Negative.  Negative for pallor, rash and wound.   Neurological:  Negative for headaches.   Hematological: Negative.    Psychiatric/Behavioral: Negative.  Negative for confusion, decreased concentration, dysphoric mood and hallucinations.    All other systems reviewed and are negative.      Past Medical History:   Diagnosis Date    Alcohol abuse     Delirium     Depression     Kidney stone     Migraines     PTSD (post-traumatic stress disorder)     Urinary tract infection     Withdrawal symptoms, alcohol        Allergies   Allergen Reactions    Reglan [Metoclopramide] Rash       Past Surgical History:   Procedure Laterality Date     SECTION  ;      SECTION      CHOLECYSTECTOMY  2012    CYSTOSCOPY  BLADDER STONE LITHOTRIPSY  2011;  2012    DILATION AND CURETTAGE, DIAGNOSTIC / THERAPEUTIC  2012       Family History   Problem Relation Age of Onset    Drug abuse Mother     Drug abuse Father     Cancer Maternal Grandmother     Heart disease Maternal Grandmother     Stroke Maternal Grandmother     Cancer Paternal Grandfather     Heart disease Paternal Grandfather     Diabetes Paternal Grandfather        Social History     Socioeconomic History    Marital status:      Spouse name: Lino Morales    Number of children: 3    Years of education: ADN   Tobacco Use    Smoking status: Every Day     Packs/day: 1.00     Years: 10.00     Additional pack years: 0.00     Total pack years: 10.00     Types: Cigarettes    Smokeless tobacco: Never   Vaping Use    Vaping Use: Every day    Substances: Nicotine    Devices: Disposable   Substance and Sexual Activity    Alcohol use: Yes     Comment: 5th vodka daily    Drug use: Yes     Types: Methamphetamines, Cocaine(coke), Other     Comment: alcohol    Sexual activity: Yes     Partners: Male     Birth control/protection: Tubal ligation           Objective   Physical Exam  Vitals and nursing note reviewed.   Constitutional:       General: She is not in acute distress.     Appearance: Normal appearance. She is normal weight. She is not ill-appearing or toxic-appearing.   HENT:      Head: Normocephalic and atraumatic.      Right Ear: Tympanic membrane, ear canal and external ear normal. There is no impacted cerumen.      Left Ear: Tympanic membrane, ear canal and external ear normal. There is no impacted cerumen.      Nose: Nose normal. No congestion or rhinorrhea.      Mouth/Throat:      Mouth: Mucous membranes are moist.      Pharynx: Oropharynx is clear. No oropharyngeal exudate or posterior oropharyngeal erythema.   Eyes:      General: No scleral icterus.        Right eye: No discharge.         Left eye: No discharge.      Extraocular Movements: Extraocular movements intact.       Conjunctiva/sclera: Conjunctivae normal.      Pupils: Pupils are equal, round, and reactive to light.   Cardiovascular:      Rate and Rhythm: Normal rate and regular rhythm.      Pulses: Normal pulses.      Heart sounds: Normal heart sounds. No murmur heard.     No friction rub. No gallop.   Pulmonary:      Effort: Pulmonary effort is normal. No respiratory distress.      Breath sounds: Normal breath sounds. No stridor. No wheezing, rhonchi or rales.   Abdominal:      General: Abdomen is flat. Bowel sounds are normal. There is no distension.      Palpations: Abdomen is soft. There is no mass.      Tenderness: There is no abdominal tenderness. There is no right CVA tenderness, guarding or rebound.      Hernia: No hernia is present.   Musculoskeletal:         General: No swelling, tenderness, deformity or signs of injury. Normal range of motion.      Cervical back: Normal range of motion and neck supple. No rigidity or tenderness.      Right lower leg: No edema.   Lymphadenopathy:      Cervical: No cervical adenopathy.   Skin:     General: Skin is warm and dry.      Coloration: Skin is not jaundiced or pale.      Findings: No bruising, erythema or lesion.   Neurological:      General: No focal deficit present.      Mental Status: She is alert and oriented to person, place, and time. Mental status is at baseline.      Cranial Nerves: No cranial nerve deficit.      Sensory: No sensory deficit.      Motor: No weakness.      Coordination: Coordination normal.      Gait: Gait normal.   Psychiatric:         Mood and Affect: Mood normal.         Behavior: Behavior normal.         Thought Content: Thought content normal.         Judgment: Judgment normal.         Procedures       Results for orders placed or performed during the hospital encounter of 02/22/24   Ethanol    Specimen: Blood   Result Value Ref Range    Ethanol 292 (H) 0 - 10 mg/dL    Ethanol % 0.292 %   hCG, Serum, Qualitative    Specimen: Blood   Result  Value Ref Range    HCG Qualitative Negative Negative   Comprehensive Metabolic Panel    Specimen: Blood   Result Value Ref Range    Glucose 122 (H) 65 - 99 mg/dL    BUN 8 6 - 20 mg/dL    Creatinine 0.62 0.57 - 1.00 mg/dL    Sodium 141 136 - 145 mmol/L    Potassium 3.2 (L) 3.5 - 5.2 mmol/L    Chloride 103 98 - 107 mmol/L    CO2 22.9 22.0 - 29.0 mmol/L    Calcium 8.6 8.6 - 10.5 mg/dL    Total Protein 7.8 6.0 - 8.5 g/dL    Albumin 4.1 3.5 - 5.2 g/dL    ALT (SGPT) 18 1 - 33 U/L    AST (SGOT) 30 1 - 32 U/L    Alkaline Phosphatase 101 39 - 117 U/L    Total Bilirubin 0.3 0.0 - 1.2 mg/dL    Globulin 3.7 gm/dL    A/G Ratio 1.1 g/dL    BUN/Creatinine Ratio 12.9 7.0 - 25.0    Anion Gap 15.1 (H) 5.0 - 15.0 mmol/L    eGFR 114.9 >60.0 mL/min/1.73   Urinalysis With Microscopic If Indicated (No Culture) - Urine, Clean Catch    Specimen: Urine, Clean Catch   Result Value Ref Range    Color, UA Yellow Yellow, Straw    Appearance, UA Clear Clear    pH, UA 7.0 5.0 - 8.0    Specific Gravity, UA 1.006 1.005 - 1.030    Glucose, UA Negative Negative    Ketones, UA Negative Negative    Bilirubin, UA Negative Negative    Blood, UA Small (1+) (A) Negative    Protein, UA Negative Negative    Leuk Esterase, UA Negative Negative    Nitrite, UA Negative Negative    Urobilinogen, UA 0.2 E.U./dL 0.2 - 1.0 E.U./dL   Urine Drug Screen - Urine, Clean Catch    Specimen: Urine, Clean Catch   Result Value Ref Range    THC, Screen, Urine Negative Negative    Phencyclidine (PCP), Urine Negative Negative    Cocaine Screen, Urine Negative Negative    Methamphetamine, Ur Negative Negative    Opiate Screen Negative Negative    Amphetamine Screen, Urine Negative Negative    Benzodiazepine Screen, Urine Negative Negative    Tricyclic Antidepressants Screen Negative Negative    Methadone Screen, Urine Negative Negative    Barbiturates Screen, Urine Negative Negative    Oxycodone Screen, Urine Negative Negative    Buprenorphine, Screen, Urine Negative Negative    Magnesium    Specimen: Blood   Result Value Ref Range    Magnesium 2.0 1.6 - 2.6 mg/dL   CBC Auto Differential    Specimen: Blood   Result Value Ref Range    WBC 8.14 3.40 - 10.80 10*3/mm3    RBC 4.90 3.77 - 5.28 10*6/mm3    Hemoglobin 13.9 12.0 - 15.9 g/dL    Hematocrit 41.3 34.0 - 46.6 %    MCV 84.3 79.0 - 97.0 fL    MCH 28.4 26.6 - 33.0 pg    MCHC 33.7 31.5 - 35.7 g/dL    RDW 14.5 12.3 - 15.4 %    RDW-SD 44.3 37.0 - 54.0 fl    MPV 10.8 6.0 - 12.0 fL    Platelets 182 140 - 450 10*3/mm3    Neutrophil % 52.1 42.7 - 76.0 %    Lymphocyte % 36.9 19.6 - 45.3 %    Monocyte % 5.3 5.0 - 12.0 %    Eosinophil % 4.4 0.3 - 6.2 %    Basophil % 1.1 0.0 - 1.5 %    Immature Grans % 0.2 0.0 - 0.5 %    Neutrophils, Absolute 4.24 1.70 - 7.00 10*3/mm3    Lymphocytes, Absolute 3.00 0.70 - 3.10 10*3/mm3    Monocytes, Absolute 0.43 0.10 - 0.90 10*3/mm3    Eosinophils, Absolute 0.36 0.00 - 0.40 10*3/mm3    Basophils, Absolute 0.09 0.00 - 0.20 10*3/mm3    Immature Grans, Absolute 0.02 0.00 - 0.05 10*3/mm3    nRBC 0.0 0.0 - 0.2 /100 WBC   Fentanyl, Urine - Urine, Clean Catch    Specimen: Urine, Clean Catch   Result Value Ref Range    Fentanyl, Urine Negative Negative   Urinalysis, Microscopic Only - Urine, Clean Catch    Specimen: Urine, Clean Catch   Result Value Ref Range    RBC, UA 6-10 (A) None Seen, 0-2 /HPF    WBC, UA 0-2 None Seen, 0-2 /HPF    Bacteria, UA None Seen None Seen /HPF    Squamous Epithelial Cells, UA 3-6 (A) None Seen, 0-2 /HPF    Hyaline Casts, UA 0-2 None Seen /LPF    Methodology Manual Light Microscopy    Scan Slide    Specimen: Blood   Result Value Ref Range    RBC Morphology Normal Normal    Platelet Morphology Normal Normal    Clumped Platelets     Ethanol    Specimen: Arm, Left; Blood   Result Value Ref Range    Ethanol 29 (H) 0 - 10 mg/dL    Ethanol % 0.029 %         ED Course  ED Course as of 02/23/24 1537   Thu Feb 22, 2024   1733 Endorsed to Dr. Interiano.  []   7677 Seen and medically managed by [facility]  provider.  Serum ethanol was 292 at 3:00.  Repeat ethanol has been ordered for 2300.  Patient expressed interest to have alcohol detox.  Will remain in the ER and medically monitored until she is cleared for psychiatric assessment for possible alcohol detox.  Electronically signed by Jill Interiano DO, 02/22/24, 5:37 PM EST.   [LK]   1906 Care was transitioned to Dr Pisano at [facility].    Electronically signed by Jill Interiano DO, 02/22/24, 7:07 PM EST.   [LK]      ED Course User Index  [BH] Andres Quevedo PA-C  [LK] Jill Interiano DO                                             Medical Decision Making  Problems Addressed:  Alcohol abuse: complicated acute illness or injury    Amount and/or Complexity of Data Reviewed  Labs: ordered.    Risk  Prescription drug management.        Final diagnoses:   Alcohol abuse       ED Disposition  ED Disposition       ED Disposition   Discharge    Condition   Stable    Comment   --               [facility]  Follow up at [facility] as discussed with crisis counselor             Medication List      No changes were made to your prescriptions during this visit.            Jill Interiano DO  02/23/24 1539     Yes

## 2024-02-23 VITALS
WEIGHT: 145 LBS | BODY MASS INDEX: 26.68 KG/M2 | TEMPERATURE: 98 F | HEIGHT: 62 IN | SYSTOLIC BLOOD PRESSURE: 163 MMHG | HEART RATE: 105 BPM | DIASTOLIC BLOOD PRESSURE: 109 MMHG | OXYGEN SATURATION: 97 % | RESPIRATION RATE: 18 BRPM

## 2024-02-23 LAB
ETHANOL BLD-MCNC: 29 MG/DL (ref 0–10)
ETHANOL UR QL: 0.03 %

## 2024-02-23 PROCEDURE — 82077 ASSAY SPEC XCP UR&BREATH IA: CPT | Performed by: STUDENT IN AN ORGANIZED HEALTH CARE EDUCATION/TRAINING PROGRAM

## 2024-02-23 NOTE — NURSING NOTE
Pt does not wish to be admitted to detox unit, as she prefers to go to a detox where she can remain with her . Pt denies any SI/HI/AVH. Does not wish to complete Intake assessment at this time.

## 2024-02-23 NOTE — NURSING NOTE
Pt states they would rather just leave and try [facility]' detox program instead. Dr. Chung contacted at this time to report pt's wishes to be discharged. Dr. Chung agreeable with pt being discharged, as pt denies any SI/HI/AVH.

## 2024-02-23 NOTE — NURSING NOTE
Pt does not wish to be discharged until  is moved to ED bed, so she may stay with him. Jaime Joy made aware.
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
